# Patient Record
Sex: FEMALE | Race: WHITE | URBAN - METROPOLITAN AREA
[De-identification: names, ages, dates, MRNs, and addresses within clinical notes are randomized per-mention and may not be internally consistent; named-entity substitution may affect disease eponyms.]

---

## 2017-07-21 ENCOUNTER — TRANSFERRED RECORDS (OUTPATIENT)
Dept: HEALTH INFORMATION MANAGEMENT | Facility: CLINIC | Age: 77
End: 2017-07-21

## 2017-08-04 ENCOUNTER — TRANSFERRED RECORDS (OUTPATIENT)
Dept: HEALTH INFORMATION MANAGEMENT | Facility: CLINIC | Age: 77
End: 2017-08-04

## 2017-08-10 ENCOUNTER — PRE VISIT (OUTPATIENT)
Dept: ORTHOPEDICS | Facility: CLINIC | Age: 77
End: 2017-08-10

## 2017-08-10 DIAGNOSIS — Z87.39 H/O ROTATOR CUFF TEAR: Primary | ICD-10-CM

## 2017-08-10 NOTE — TELEPHONE ENCOUNTER
Radiology reports received from [facility].  Xray right shoulder 8/12/16  MRI right shoulder 8/16/16  MR achilles tendon right 9/7/16  FL injection right shoulder 10/13/16

## 2017-08-10 NOTE — TELEPHONE ENCOUNTER
1.  Date/reason for appt: 8/21/17 11AM - Rt Shoulder Cuff Tear  2.  Referring provider: self  3.  Call to patient (Yes / No - short description): Yes - LVM for pt. Need FANNY for [facility]  4.  Previous care at / records requested from: [facility] -- faxed request in attempt for records

## 2017-08-14 NOTE — TELEPHONE ENCOUNTER
Records received from [facility].   Included  Office notes: 4/11/17, 7/21/17, 8/4/17  Radiology reports: MR right shoulder 1/18/17- ProScan alejandra Fagan   MR right shoulder 8/16/16  Xray right shoulder 8/12/16  FL injection 10/13/16  Other: injection 4/11/17

## 2017-08-21 ENCOUNTER — OFFICE VISIT (OUTPATIENT)
Dept: ORTHOPEDICS | Facility: CLINIC | Age: 77
End: 2017-08-21

## 2017-08-21 VITALS — BODY MASS INDEX: 25.27 KG/M2 | WEIGHT: 142.6 LBS | HEIGHT: 63 IN

## 2017-08-21 DIAGNOSIS — M25.519 CHRONIC SHOULDER PAIN, UNSPECIFIED LATERALITY: Primary | ICD-10-CM

## 2017-08-21 DIAGNOSIS — G89.29 CHRONIC SHOULDER PAIN, UNSPECIFIED LATERALITY: Primary | ICD-10-CM

## 2017-08-21 PROBLEM — M75.111 INCOMPLETE TEAR OF RIGHT ROTATOR CUFF: Status: ACTIVE | Noted: 2017-04-14

## 2017-08-21 RX ORDER — CHLORAL HYDRATE 500 MG
CAPSULE ORAL
COMMUNITY
Start: 2010-04-12

## 2017-08-21 RX ORDER — ZOLPIDEM TARTRATE 5 MG/1
5 TABLET ORAL
COMMUNITY
Start: 2016-10-27

## 2017-08-21 RX ORDER — TRAZODONE HYDROCHLORIDE 50 MG/1
25-50 TABLET, FILM COATED ORAL
COMMUNITY
Start: 2016-10-27

## 2017-08-21 RX ORDER — IBUPROFEN 200 MG
TABLET ORAL
COMMUNITY

## 2017-08-21 RX ORDER — LORATADINE 10 MG/1
10 TABLET ORAL
COMMUNITY
Start: 2012-10-25

## 2017-08-21 RX ORDER — ESCITALOPRAM OXALATE 20 MG/1
20 TABLET ORAL
COMMUNITY
Start: 2017-07-26

## 2017-08-21 ASSESSMENT — ENCOUNTER SYMPTOMS
SWOLLEN GLANDS: 0
DISTURBANCES IN COORDINATION: 0
NUMBNESS: 0
PARALYSIS: 0
TREMORS: 0
LOSS OF CONSCIOUSNESS: 0
TINGLING: 0
DIZZINESS: 1
HEADACHES: 0
SPEECH CHANGE: 0
MEMORY LOSS: 1
WEAKNESS: 0
SEIZURES: 0
BRUISES/BLEEDS EASILY: 1

## 2017-08-21 NOTE — NURSING NOTE
"Reason For Visit:   Chief Complaint   Patient presents with     Consult     RIght shoulder rotator cuff tear. Saw Dr. Carter at [facility] in 9/2016.        PCP: No primary care provider on file.  Ref: self    ?  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  Date of injury: 2016  Type of injury: Carrying a heavy box downstairs. Had a fall later in the year.  Date of surgery: None  Smoker: No      Right hand dominant    SANE score  Affected shoulder: Right  Right shoulder SANE: 10  Left shoulder SANE: 75    Dynamometer  Forward Elevation:  R = 5 pounds,  L = 15 pounds  External Rotation:   R = 10 pounds,  L = 11 pounds    Ht 1.6 m (5' 3\")  Wt 64.7 kg (142 lb 9.6 oz)  BMI 25.26 kg/m2      Pain Assessment  Patient Currently in Pain: Yes  0-10 Pain Scale: 8  Primary Pain Location: Shoulder  Pain Orientation: Right  Pain Descriptors: Aching, Constant, Discomfort  Alleviating Factors: Rest  Aggravating Factors: Movement      Cielo Ceja LPN      "

## 2017-08-21 NOTE — PROGRESS NOTES
REASON FOR VISIT:  Right shoulder rotator cuff tear.      HISTORY OF PRESENT ILLNESS:  Autumn Matthews is a 77-year-old right dominant female with right-sided shoulder pain present for greater than 1 year.  She has previously been seen by Dr. Carter in 2016 at [facility].  She has since followed with Dr. Forrest.  Prior interventions for her right shoulder include intraarticular injection in 2016, subacromial injection in 04/2017 and 07/21/2017 with diminishing effect from subsequent injections, now no relief from her ongoing anterior AC and nighttime right shoulder pain.  She presents today to discuss surgical interventions.  She lives much of her year in [place], anticipates if it were not for intervention, would return there in October but is willing to forego this if surgery is necessary.      PAST MEDICAL HISTORY:  Hemochromatosis, anxiety, osteopenia, hyperlipidemia, idiopathic scoliosis, hip arthritis, bilateral knee replacements, insomnia, lumbar facet arthropathy.      PAST SURGICAL HISTORY:  Appendectomy, dilation and curettage, blepharoplasty, brow lift, intracapsular cataract extraction, breast biopsy, total knee arthroplasty of the right 2014 and the left in 2014.      FAMILY HISTORY:  No history of bleeding or clotting problems.      SOCIAL HISTORY:  Does not drink alcohol, does not smoke, lives 4-5 months of the year in [place].      MEDICATIONS:  Vitamin D, Citalopram, loratadine, magnesium, multivitamin, fish oil, Trazadone.      PHYSICAL EXAMINATION:  Alert and oriented, in no apparent distress, comfortable on room air, a well-appearing, well-nourished female appearing stated age.      Examination of the right upper extremity reveals she has no significant atrophy of her deltoid, no prior surgical scars.  Forward flexion to 80 degrees, abduction to 60 degrees.  Pain with passive shoulder flexion and abduction beyond 90 degrees.  Positive Phyllis.  Positive Neer.  Positive tenderness over the AC joint.  4-/5,  external rotation, 4/5 forward flexion, 4/5 abduction, 4/5 internal rotation, elbow at the side.  Sensation intact medial, ulnar, radial, axillary distributions.      IMAGING:  MRI right shoulder 01/18/2017 demonstrates a full thickness retracted to the edge of the humeral head, supraspinatus and anterior infraspinatus.  Complete tear of the subscapularis.  Retracted long head of the biceps.      IMPRESSION:  77-year-old right hand dominant female with right supraspinatus cuff tear, partial infraspinatus cuff tear and subscapularis with degenerative spontaneous long head of the biceps tear.  AC joint symptomatic arthrosis.      PLAN:  We discussed these findings and noted given her last injection at 07/21/2017 she would not be a candidate for any surgical intervention for a 3 month period.  I will see her back in September with an MRI repeated on the right side to review the muscular atrophy and then further consider if she is a candidate for arthroscopic rotator cuff repair versus reverse total shoulder arthroplasty.      Seen and examined by Dr. Carter who is in agreement with the plan as above.       Answers for HPI/ROS submitted by the patient on 8/21/2017   General Symptoms: No  Skin Symptoms: No  HENT Symptoms: No  EYE SYMPTOMS: No  HEART SYMPTOMS: No  LUNG SYMPTOMS: No  INTESTINAL SYMPTOMS: No  URINARY SYMPTOMS: No  GYNECOLOGIC SYMPTOMS: No  BREAST SYMPTOMS: No  SKELETAL SYMPTOMS: No  BLOOD SYMPTOMS: Yes  NERVOUS SYSTEM SYMPTOMS: Yes  MENTAL HEALTH SYMPTOMS: No  Anemia: No  Swollen glands: No  Easy bleeding or bruising: Yes  Edema or swelling: No  Trouble with coordination: No  Dizziness or trouble with balance: Yes  Fainting or black-out spells: No  Memory loss: Yes  Headache: No  Seizures: No  Speech problems: No  Tingling: No  Tremor: No  Weakness: No  Difficulty walking: No  Paralysis: No  Numbness: No  I saw the patient with the resident.  I agree with the resident note and plan of care.      Mike  Sathya Carter MD

## 2017-08-21 NOTE — MR AVS SNAPSHOT
After Visit Summary   8/21/2017    Autumn Matthews    MRN: [id number]           Patient Information     Date Of Birth          1940        Visit Information        Provider Department      8/21/2017 11:00 AM Mike Carter MD [facility]        Today's Diagnoses     Shoulder pain    -  1       Follow-ups after your visit        Your next 10 appointments already scheduled     Sep 18, 2017 12:40 PM CDT   (Arrive by 12:25 PM)   RETURN SHOULDER with Mike Carter MD   [facility] ([facility] [place])    [address] [place] [address] [id number] [phone]              Future tests that were ordered for you today     Open Future Orders        Priority Expected Expires Ordered    MRI Upper extremity joint w/o contrast RT* Routine  8/21/2018 8/21/2017            Who to contact     Please call your clinic at [phone] to:    Ask questions about your health    Make or cancel appointments    Discuss your medicines    Learn about your test results    Speak to your doctor   If you have compliments or concerns about an experience at your clinic, or if you wish to file a complaint, please contact [facility] Patient Relations at [phone] or email us at [email or web address]         Additional Information About Your Visit        MyChart Information     [employer] is an electronic gateway that provides easy, online access to your medical records. With [employer], you can request a clinic appointment, read your test results, renew a prescription or communicate with your care team.     To sign up for [employer] visit the website at [email or web address]   You will be asked to enter the access code listed below, as well as some personal information. Please follow the directions to create your username and password.     Your access code is: [email or web address]  Expires: 11/6/2017  6:31 AM    "  Your access code will  in 90 days. If you need help or a new code, please contact your [facility] or call [phone] for assistance.        Care EveryWhere ID     This is your Care EveryWhere ID. This could be used by other organizations to access your [facility] medical records  [id number]        Your Vitals Were     Height BMI (Body Mass Index)                1.6 m (5' 3\") 25.26 kg/m2           Blood Pressure from Last 3 Encounters:   No data found for BP    Weight from Last 3 Encounters:   17 64.7 kg (142 lb 9.6 oz)               Primary Care Provider    None Specified       No primary provider on file.        Equal Access to Services     [facility] Center[facility]LE[facility] : Kojo Abbasi, kun vora, faina watkins, azar jensen . So [facility] [phone].    ATENCIÓN: Si habla español, tiene a pinzon disposición servicios gratuitos de asistencia lingüística. Llame al [phone].    We comply with applicable federal civil rights laws and [place] laws. We do not discriminate on the basis of race, color, national origin, age, disability sex, sexual orientation or gender identity.            Thank you!     Thank you for choosing [facility]  for your care. Our goal is always to provide you with excellent care. Hearing back from our patients is one way we can continue to improve our services. Please take a few minutes to complete the written survey that you may receive in the mail after your visit with us. Thank you!             Your Updated Medication List - Protect others around you: Learn how to safely use, store and throw away your medicines at www.disposemymeds.org.          This list is accurate as of: 17 12:26 PM.  Always use your most recent med list.                   Brand Name Dispense Instructions for use Diagnosis    ADVIL 200 MG tablet   Generic drug:  ibuprofen           cholecalciferol 1000 UNIT " tablet    vitamin D     daily (every 24 hours).        escitalopram 20 MG tablet    LEXAPRO     Take 20 mg by mouth        ESTRADIOL 0.025/ESTRIOL 0.1 MG/GM CREAM      Estradiol vaginal cream 20mcg/gm.  Apply 0.5gm into vagina two times per week.        fish oil-omega-3 fatty acids 1000 MG capsule      daily (every 24 hours).        loratadine 10 MG tablet    CLARITIN     Take 10 mg by mouth        MAGNESIUM PO           MULTIPLE VITAMINS-MINERALS PO           traZODone 50 MG tablet    DESYREL     Take 25-50 mg by mouth        zolpidem 5 MG tablet    AMBIEN     Take 5 mg by mouth

## 2017-08-21 NOTE — LETTER
8/21/2017       RE: Autumn Matthews  [address]  [place] [address]     Dear Colleague,    Thank you for referring your patient, Autumn Matthews, to the [facility] at [facility]. Please see a copy of my visit note below.    REASON FOR VISIT:  Right shoulder rotator cuff tear.      HISTORY OF PRESENT ILLNESS:  Autumn Matthews is a 77-year-old right dominant female with right-sided shoulder pain present for greater than 1 year.  She has previously been seen by Dr. Carter in 2016 at [facility].  She has since followed with Dr. Forrest.  Prior interventions for her right shoulder include intraarticular injection in 2016, subacromial injection in 04/2017 and 07/21/2017 with diminishing effect from subsequent injections, now no relief from her ongoing anterior AC and nighttime right shoulder pain.  She presents today to discuss surgical interventions.  She lives much of her year in [place], anticipates if it were not for intervention, would return there in October but is willing to forego this if surgery is necessary.      PAST MEDICAL HISTORY:  Hemochromatosis, anxiety, osteopenia, hyperlipidemia, idiopathic scoliosis, hip arthritis, bilateral knee replacements, insomnia, lumbar facet arthropathy.      PAST SURGICAL HISTORY:  Appendectomy, dilation and curettage, blepharoplasty, brow lift, intracapsular cataract extraction, breast biopsy, total knee arthroplasty of the right 2014 and the left in 2014.      FAMILY HISTORY:  No history of bleeding or clotting problems.      SOCIAL HISTORY:  Does not drink alcohol, does not smoke, lives 4-5 months of the year in [place].      MEDICATIONS:  Vitamin D, Citalopram, loratadine, magnesium, multivitamin, fish oil, Trazadone.      PHYSICAL EXAMINATION:  Alert and oriented, in no apparent distress, comfortable on room air, a well-appearing, well-nourished female appearing stated age.      Examination of the right upper extremity  reveals she has no significant atrophy of her deltoid, no prior surgical scars.  Forward flexion to 80 degrees, abduction to 60 degrees.  Pain with passive shoulder flexion and abduction beyond 90 degrees.  Positive Phyllis.  Positive Neer.  Positive tenderness over the AC joint.  4-/5, external rotation, 4/5 forward flexion, 4/5 abduction, 4/5 internal rotation, elbow at the side.  Sensation intact medial, ulnar, radial, axillary distributions.      IMAGING:  MRI right shoulder 01/18/2017 demonstrates a full thickness retracted to the edge of the humeral head, supraspinatus and anterior infraspinatus.  Complete tear of the subscapularis.  Retracted long head of the biceps.      IMPRESSION:  77-year-old right hand dominant female with right supraspinatus cuff tear, partial infraspinatus cuff tear and subscapularis with degenerative spontaneous long head of the biceps tear.  AC joint symptomatic arthrosis.      PLAN:  We discussed these findings and noted given her last injection at 07/21/2017 she would not be a candidate for any surgical intervention for a 3 month period.  I will see her back in September with an MRI repeated on the right side to review the muscular atrophy and then further consider if she is a candidate for arthroscopic rotator cuff repair versus reverse total shoulder arthroplasty.      Seen and examined by Dr. Carter who is in agreement with the plan as above.     I saw the patient with the resident.  I agree with the resident note and plan of care.      Mike Carter MD

## 2017-08-22 ENCOUNTER — TELEPHONE (OUTPATIENT)
Dept: ORTHOPEDICS | Facility: CLINIC | Age: 77
End: 2017-08-22

## 2017-08-22 NOTE — TELEPHONE ENCOUNTER
Patient informed Dr Carter does not prescribe pain medications before surgery unless it is an acute fracture.  Recommendation is to discuss pain management with her primary MD.  She verbalized understanding and is agreeable with this.

## 2017-08-22 NOTE — TELEPHONE ENCOUNTER
Patient having pain in right shoulder with any kind of movement. Caller said she brought it up to Dr. Carter that the advil isn't helping, but then got off topic before it was resolved. Vicodin and Tramadol cause nausea, vomiting and dizziness. Daughter recommended Lyrica. Patient rates pain at 8/10. Please call her asap.

## 2017-09-18 ENCOUNTER — OFFICE VISIT (OUTPATIENT)
Dept: ORTHOPEDICS | Facility: CLINIC | Age: 77
End: 2017-09-18

## 2017-09-18 VITALS — BODY MASS INDEX: 24.98 KG/M2 | HEIGHT: 63 IN | WEIGHT: 141 LBS

## 2017-09-18 DIAGNOSIS — M75.101 ROTATOR CUFF TEAR ARTHROPATHY, RIGHT: Primary | ICD-10-CM

## 2017-09-18 DIAGNOSIS — M12.811 ROTATOR CUFF TEAR ARTHROPATHY, RIGHT: Primary | ICD-10-CM

## 2017-09-18 NOTE — NURSING NOTE
"Reason For Visit:   Chief Complaint   Patient presents with     Shoulder Pain     F/U MRI done at [facility], Right rotator cuff tear     PCP: No primary care provider on file.  Ref: self     ?  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  Date of injury: 2016  Type of injury: Carrying a heavy box downstairs. Had a fall later in the year.  Date of surgery: None  Smoker: No        Right hand dominant    SANE score  Affected shoulder:  Right  Right shoulder SANE: 20  Left shoulder SANE: 70    Ht 1.6 m (5' 3\")  Wt 64 kg (141 lb)  BMI 24.98 kg/m2      Pain Assessment  Patient Currently in Pain: Yes  0-10 Pain Scale: 6  Primary Pain Location: Shoulder  Pain Orientation: Right  Pain Descriptors: Aching, Discomfort, Constant  Alleviating Factors: Rest  Aggravating Factors: Movement, Stretching, Exercise    Cielo Ceja LPN      "

## 2017-09-18 NOTE — LETTER
9/18/2017       RE: Autumn Mattehws  [address]  [place] [address]     Dear Colleague,    Thank you for referring your patient, Autumn Matthews, to the [facility]IC at [facility]. Please see a copy of my visit note below.    REASON FOR VISIT:  Re-visit for MRI followup.      HISTORY OF PRESENT ILLNESS:  Autumn Matthews is a 77-year-old right hand dominant female with right-sided shoulder dysfunction secondary to a massive rotator cuff tear.  She was last seen 4 weeks ago and had previously undergone a corticosteroid injection which did offer her modest relief.  She was interested in further surgical options and we noted we would need a repeat MRI to assess her rotator cuff musculature prior to any intervention.  She returns today to review this.      Her function has not changed in the interim, she has limited forward flexion almost no external rotation and limited internal rotation strength with equivocal subscapularis testing.      PHYSICAL EXAMINATION:  Alert, oriented and in no apparent distress, comfortable on room air.  Ambulates in the room independently, does not use assistive device.  Otherwise, a well-appearing of stated age.  Examination of the right shoulder has forward flexion to approximately 50 degrees, active external rotation to neutral, internal rotation to the belly, but a negative belly press, negative bear hug not able to lift off, has pain with shoulder range of motion with any further active assisted motion.  Sensation is intact throughout with intact deltoid firing axillary sensation.      IMAGING:  MRI of the right shoulder from 9/16/2017 is reviewed, shows a massive rotator cuff tear involving supra, infra and most of the entire upper portion of the subscap.  There is migration of the humeral head into the acromion with degenerative change beginning significant acromioclavicular arthrosis.  The sagittal T1 is concerning and grade  3 atrophy of the subscapularis, 50% atrophy of the supraspinatus and 25% atrophy of the infra.      IMPRESSION:  77-year-old female with right massive rotator cuff repair with significant subscapularis atrophy.      PLAN:  We discussed her options going forward and that she could elect to undergo rotator cuff repair versus right reverse total shoulder arthroplasty.  We explored the potential outcomes for each of these and the risk that would be associated and she will elect as she moves forward with the plan accordingly.  We did lean towards a more predictable outcome from her reverse total shoulder arthroplasty, but this would require permanent 8 pound lifting restriction and limitation of tennis and golf for the foreseeable future.      She was seen and examined by Dr. Carter and will contact our office with further plans going forward.  Otherwise, follow up p.r.n.     I saw the patient with the resident.  I agree with the resident note and plan of care.      Mike Carter MD

## 2017-09-18 NOTE — NURSING NOTE
Teaching Flowsheet   Relevant Diagnosis: Rotator cuff atrophy  Teaching Topic: Pre-op for rotator cuff repair versus reverse TSA - patient will talk with her daughter concerning options and call with decision     Person(s) involved in teaching:   Patient     Motivation Level:  Asks Questions: Yes  Eager to Learn: Yes  Cooperative: Yes  Receptive (willing/able to accept information): Yes  Any cultural factors/Temple beliefs that may influence understanding or compliance? No     Patient demonstrates understanding of the following:  Reason for the appointment, diagnosis and treatment plan: Yes  Knowledge of proper use of medications and conditions for which they are ordered (with special attention to potential side effects or drug interactions): Yes  Which situations necessitate calling provider and whom to contact: Yes    Lives alone.  States her daughter who lives in [place] will stay with her for a week after surgery.  Possible TCU after hospital discharge (reverse TSA)     Proper use and care of sling x 6-8 weeks (medical equip, care aids, etc.): Yes  Nutritional needs and diet plan: Yes  Pain management techniques: Yes  Wound Care: Yes  How and/when to access community resources: Yes     Instructional Materials Used/Given: Pre-op packet, surgical soap, written information on reverse total shoulder     Time spent with patient: 12.

## 2017-09-18 NOTE — MR AVS SNAPSHOT
"              After Visit Summary   2017    Autumn Matthews    MRN: [id number]           Patient Information     Date Of Birth          1940        Visit Information        Provider Department      2017 12:40 PM Mike Carter MD [facility]        Today's Diagnoses     H/O rotator cuff tear    -  1       Follow-ups after your visit        Who to contact     Please call your clinic at [phone] to:    Ask questions about your health    Make or cancel appointments    Discuss your medicines    Learn about your test results    Speak to your doctor   If you have compliments or concerns about an experience at your clinic, or if you wish to file a complaint, please contact [facility] Patient Relations at [phone] or email us at [email or web address]         Additional Information About Your Visit        MyChart Information     Vine is an electronic gateway that provides easy, online access to your medical records. With Vine, you can request a clinic appointment, read your test results, renew a prescription or communicate with your care team.     To sign up for [email or web address] visit the website at [email or web address]   You will be asked to enter the access code listed below, as well as some personal information. Please follow the directions to create your username and password.     Your access code is: [email or web address]  Expires: 2017  6:31 AM     Your access code will  in 90 days. If you need help or a new code, please contact your [facility] or call [phone] for assistance.        Care EveryWhere ID     This is your Care EveryWhere ID. This could be used by other organizations to access your [place] medical records  [id number]        Your Vitals Were     Height BMI (Body Mass Index)                1.6 m (5' 3\") 24.98 kg/m2           Blood Pressure from Last 3 Encounters:   No data found " for BP    Weight from Last 3 Encounters:   09/18/17 64 kg (141 lb)   08/21/17 64.7 kg (142 lb 9.6 oz)              Today, you had the following     No orders found for display       Primary Care Provider    None Specified       No primary provider on file.        Equal Access to Services     MICHELLE KESSLER : Hadii aad ku hadyeisonarnav Shelbiesujit, wachioda luqadaha, qasánchezta kaalmada tialorenanichole, azar diazloidasaritha lucero. So [facility] [phone].    ATENCIÓN: Si habla español, tiene a pinzon disposición servicios gratuitos de asistencia lingüística. Llame al [phone].    We comply with applicable federal civil rights laws and [place] laws. We do not discriminate on the basis of race, color, national origin, age, disability sex, sexual orientation or gender identity.            Thank you!     Thank you for choosing [facility]  for your care. Our goal is always to provide you with excellent care. Hearing back from our patients is one way we can continue to improve our services. Please take a few minutes to complete the written survey that you may receive in the mail after your visit with us. Thank you!             Your Updated Medication List - Protect others around you: Learn how to safely use, store and throw away your medicines at www.disposemymeds.org.          This list is accurate as of: 9/18/17 11:59 PM.  Always use your most recent med list.                   Brand Name Dispense Instructions for use Diagnosis    ADVIL 200 MG tablet   Generic drug:  ibuprofen           cholecalciferol 1000 UNIT tablet    vitamin D     daily (every 24 hours).        escitalopram 20 MG tablet    LEXAPRO     Take 20 mg by mouth        ESTRADIOL 0.025/ESTRIOL 0.1 MG/GM CREAM      Estradiol vaginal cream 20mcg/gm.  Apply 0.5gm into vagina two times per week.        fish oil-omega-3 fatty acids 1000 MG capsule      daily (every 24 hours).        loratadine 10 MG tablet    CLARITIN     Take 10 mg by mouth         MAGNESIUM PO           MULTIPLE VITAMINS-MINERALS PO           traZODone 50 MG tablet    DESYREL     Take 25-50 mg by mouth        zolpidem 5 MG tablet    AMBIEN     Take 5 mg by mouth

## 2017-09-18 NOTE — PROGRESS NOTES
REASON FOR VISIT:  Re-visit for MRI followup.      HISTORY OF PRESENT ILLNESS:  Autumn Matthews is a 77-year-old right hand dominant female with right-sided shoulder dysfunction secondary to a massive rotator cuff tear.  She was last seen 4 weeks ago and had previously undergone a corticosteroid injection which did offer her modest relief.  She was interested in further surgical options and we noted we would need a repeat MRI to assess her rotator cuff musculature prior to any intervention.  She returns today to review this.      Her function has not changed in the interim, she has limited forward flexion almost no external rotation and limited internal rotation strength with equivocal subscapularis testing.      PHYSICAL EXAMINATION:  Alert, oriented and in no apparent distress, comfortable on room air.  Ambulates in the room independently, does not use assistive device.  Otherwise, a well-appearing of stated age.  Examination of the right shoulder has forward flexion to approximately 50 degrees, active external rotation to neutral, internal rotation to the belly, but a negative belly press, negative bear hug not able to lift off, has pain with shoulder range of motion with any further active assisted motion.  Sensation is intact throughout with intact deltoid firing axillary sensation.      IMAGING:  MRI of the right shoulder from 9/16/2017 is reviewed, shows a massive rotator cuff tear involving supra, infra and most of the entire upper portion of the subscap.  There is migration of the humeral head into the acromion with degenerative change beginning significant acromioclavicular arthrosis.  The sagittal T1 is concerning and grade 3 atrophy of the subscapularis, 50% atrophy of the supraspinatus and 25% atrophy of the infra.      IMPRESSION:  77-year-old female with right massive rotator cuff repair with significant subscapularis atrophy.      PLAN:  We discussed her options going forward and that she could elect  to undergo rotator cuff repair versus right reverse total shoulder arthroplasty.  We explored the potential outcomes for each of these and the risk that would be associated and she will elect as she moves forward with the plan accordingly.  We did lean towards a more predictable outcome from her reverse total shoulder arthroplasty, but this would require permanent 8 pound lifting restriction and limitation of tennis and golf for the foreseeable future.      She was seen and examined by Dr. Carter and will contact our office with further plans going forward.  Otherwise, follow up p.r.n.     I saw the patient with the resident.  I agree with the resident note and plan of care.      Mike Carter MD

## 2017-10-11 ENCOUNTER — APPOINTMENT (OUTPATIENT)
Dept: GENERAL RADIOLOGY | Facility: CLINIC | Age: 77
DRG: 483 | End: 2017-10-11
Attending: ORTHOPAEDIC SURGERY
Payer: MEDICARE

## 2017-10-11 ENCOUNTER — SURGERY (OUTPATIENT)
Age: 77
End: 2017-10-11

## 2017-10-11 ENCOUNTER — HOSPITAL ENCOUNTER (INPATIENT)
Facility: CLINIC | Age: 77
LOS: 3 days | Discharge: HOME HEALTH | DRG: 483 | End: 2017-10-14
Attending: ORTHOPAEDIC SURGERY | Admitting: ORTHOPAEDIC SURGERY
Payer: MEDICARE

## 2017-10-11 ENCOUNTER — ANESTHESIA EVENT (OUTPATIENT)
Dept: SURGERY | Facility: CLINIC | Age: 77
DRG: 483 | End: 2017-10-11
Payer: MEDICARE

## 2017-10-11 ENCOUNTER — ANESTHESIA (OUTPATIENT)
Dept: SURGERY | Facility: CLINIC | Age: 77
DRG: 483 | End: 2017-10-11
Payer: MEDICARE

## 2017-10-11 DIAGNOSIS — Z96.619 HISTORY OF TOTAL SHOULDER REPLACEMENT, UNSPECIFIED LATERALITY: Primary | ICD-10-CM

## 2017-10-11 LAB
ABO + RH BLD: NORMAL
ABO + RH BLD: NORMAL
ANION GAP SERPL CALCULATED.3IONS-SCNC: 6 MMOL/L (ref 3–14)
BLD GP AB SCN SERPL QL: NORMAL
BLOOD BANK CMNT PATIENT-IMP: NORMAL
BUN SERPL-MCNC: 18 MG/DL (ref 7–30)
CALCIUM SERPL-MCNC: 8.3 MG/DL (ref 8.5–10.1)
CHLORIDE SERPL-SCNC: 105 MMOL/L (ref 94–109)
CO2 SERPL-SCNC: 28 MMOL/L (ref 20–32)
CREAT SERPL-MCNC: 1 MG/DL (ref 0.52–1.04)
GFR SERPL CREATININE-BSD FRML MDRD: 54 ML/MIN/1.7M2
GLUCOSE SERPL-MCNC: 196 MG/DL (ref 70–99)
GLUCOSE SERPL-MCNC: 89 MG/DL (ref 70–99)
POTASSIUM SERPL-SCNC: 3.9 MMOL/L (ref 3.4–5.3)
SODIUM SERPL-SCNC: 139 MMOL/L (ref 133–144)
SPECIMEN EXP DATE BLD: NORMAL

## 2017-10-11 PROCEDURE — 25000128 H RX IP 250 OP 636: Performed by: ANESTHESIOLOGY

## 2017-10-11 PROCEDURE — 25000125 ZZHC RX 250: Performed by: ANESTHESIOLOGY

## 2017-10-11 PROCEDURE — 36415 COLL VENOUS BLD VENIPUNCTURE: CPT | Performed by: ORTHOPAEDIC SURGERY

## 2017-10-11 PROCEDURE — 99231 SBSQ HOSP IP/OBS SF/LOW 25: CPT | Performed by: INTERNAL MEDICINE

## 2017-10-11 PROCEDURE — A9270 NON-COVERED ITEM OR SERVICE: HCPCS | Mod: GY | Performed by: INTERNAL MEDICINE

## 2017-10-11 PROCEDURE — 82947 ASSAY GLUCOSE BLOOD QUANT: CPT | Performed by: ANESTHESIOLOGY

## 2017-10-11 PROCEDURE — 36000062 ZZH SURGERY LEVEL 4 1ST 30 MIN - UMMC: Performed by: ORTHOPAEDIC SURGERY

## 2017-10-11 PROCEDURE — 40000986 XR SHOULDER RT PORT G/E 2 VW: Mod: RT

## 2017-10-11 PROCEDURE — C9290 INJ, BUPIVACAINE LIPOSOME: HCPCS | Performed by: ANESTHESIOLOGY

## 2017-10-11 PROCEDURE — 25000132 ZZH RX MED GY IP 250 OP 250 PS 637: Mod: GY | Performed by: ORTHOPAEDIC SURGERY

## 2017-10-11 PROCEDURE — 25000128 H RX IP 250 OP 636: Performed by: STUDENT IN AN ORGANIZED HEALTH CARE EDUCATION/TRAINING PROGRAM

## 2017-10-11 PROCEDURE — 27110028 ZZH OR GENERAL SUPPLY NON-STERILE: Performed by: ORTHOPAEDIC SURGERY

## 2017-10-11 PROCEDURE — 86850 RBC ANTIBODY SCREEN: CPT | Performed by: ORTHOPAEDIC SURGERY

## 2017-10-11 PROCEDURE — A9270 NON-COVERED ITEM OR SERVICE: HCPCS | Mod: GY | Performed by: ORTHOPAEDIC SURGERY

## 2017-10-11 PROCEDURE — 0RRJ00Z REPLACEMENT OF RIGHT SHOULDER JOINT WITH REVERSE BALL AND SOCKET SYNTHETIC SUBSTITUTE, OPEN APPROACH: ICD-10-PCS | Performed by: ORTHOPAEDIC SURGERY

## 2017-10-11 PROCEDURE — 71000014 ZZH RECOVERY PHASE 1 LEVEL 2 FIRST HR: Performed by: ORTHOPAEDIC SURGERY

## 2017-10-11 PROCEDURE — 80048 BASIC METABOLIC PNL TOTAL CA: CPT | Performed by: INTERNAL MEDICINE

## 2017-10-11 PROCEDURE — 25000128 H RX IP 250 OP 636: Performed by: ORTHOPAEDIC SURGERY

## 2017-10-11 PROCEDURE — 25000125 ZZHC RX 250: Performed by: NURSE ANESTHETIST, CERTIFIED REGISTERED

## 2017-10-11 PROCEDURE — 37000009 ZZH ANESTHESIA TECHNICAL FEE, EACH ADDTL 15 MIN: Performed by: ORTHOPAEDIC SURGERY

## 2017-10-11 PROCEDURE — C1713 ANCHOR/SCREW BN/BN,TIS/BN: HCPCS | Performed by: ORTHOPAEDIC SURGERY

## 2017-10-11 PROCEDURE — 86901 BLOOD TYPING SEROLOGIC RH(D): CPT | Performed by: ORTHOPAEDIC SURGERY

## 2017-10-11 PROCEDURE — 25000132 ZZH RX MED GY IP 250 OP 250 PS 637: Mod: GY | Performed by: INTERNAL MEDICINE

## 2017-10-11 PROCEDURE — 25800025 ZZH RX 258: Performed by: ORTHOPAEDIC SURGERY

## 2017-10-11 PROCEDURE — 25000125 ZZHC RX 250: Performed by: ORTHOPAEDIC SURGERY

## 2017-10-11 PROCEDURE — 25000128 H RX IP 250 OP 636: Performed by: NURSE ANESTHETIST, CERTIFIED REGISTERED

## 2017-10-11 PROCEDURE — 12000001 ZZH R&B MED SURG/OB UMMC

## 2017-10-11 PROCEDURE — 37000008 ZZH ANESTHESIA TECHNICAL FEE, 1ST 30 MIN: Performed by: ORTHOPAEDIC SURGERY

## 2017-10-11 PROCEDURE — 86900 BLOOD TYPING SEROLOGIC ABO: CPT | Performed by: ORTHOPAEDIC SURGERY

## 2017-10-11 PROCEDURE — 40000171 ZZH STATISTIC PRE-PROCEDURE ASSESSMENT III: Performed by: ORTHOPAEDIC SURGERY

## 2017-10-11 PROCEDURE — 36415 COLL VENOUS BLD VENIPUNCTURE: CPT | Performed by: INTERNAL MEDICINE

## 2017-10-11 PROCEDURE — 36000064 ZZH SURGERY LEVEL 4 EA 15 ADDTL MIN - UMMC: Performed by: ORTHOPAEDIC SURGERY

## 2017-10-11 PROCEDURE — C1776 JOINT DEVICE (IMPLANTABLE): HCPCS | Performed by: ORTHOPAEDIC SURGERY

## 2017-10-11 PROCEDURE — 27210794 ZZH OR GENERAL SUPPLY STERILE: Performed by: ORTHOPAEDIC SURGERY

## 2017-10-11 DEVICE — IMP SCR LOCKING BIOM REV SHLDR 3.5 HEX 4.75X20MM 180551: Type: IMPLANTABLE DEVICE | Site: SHOULDER | Status: FUNCTIONAL

## 2017-10-11 DEVICE — IMP GLENOSPHERE BIOM REV SHLDR 41MM STD 115320: Type: IMPLANTABLE DEVICE | Site: SHOULDER | Status: FUNCTIONAL

## 2017-10-11 DEVICE — IMP HUMERAL TRAY BIOM REV SHLDR 44MM STD 115370: Type: IMPLANTABLE DEVICE | Site: SHOULDER | Status: FUNCTIONAL

## 2017-10-11 DEVICE — IMP BASEPLATE MINI GLENOSPHERE BIOM REV SHLDR 25MM 010000589: Type: IMPLANTABLE DEVICE | Site: SHOULDER | Status: FUNCTIONAL

## 2017-10-11 DEVICE — IMP SCR LOCKING BIOM REV SHLDR 3.5 HEX 4.75X15MM 180550: Type: IMPLANTABLE DEVICE | Site: SHOULDER | Status: FUNCTIONAL

## 2017-10-11 DEVICE — IMP HUMERAL BEARING BIOM REV SHLDR 44-41 STD XL-115366: Type: IMPLANTABLE DEVICE | Site: SHOULDER | Status: FUNCTIONAL

## 2017-10-11 DEVICE — IMPLANTABLE DEVICE: Type: IMPLANTABLE DEVICE | Site: SHOULDER | Status: FUNCTIONAL

## 2017-10-11 DEVICE — IMP SCR LOCKING BIOM REV SHLDR 3.5 HEX 4.75X30MM 180553: Type: IMPLANTABLE DEVICE | Site: SHOULDER | Status: FUNCTIONAL

## 2017-10-11 RX ORDER — PROCHLORPERAZINE MALEATE 5 MG
5 TABLET ORAL EVERY 6 HOURS PRN
Status: DISCONTINUED | OUTPATIENT
Start: 2017-10-11 | End: 2017-10-14 | Stop reason: HOSPADM

## 2017-10-11 RX ORDER — FENTANYL CITRATE 50 UG/ML
25-50 INJECTION, SOLUTION INTRAMUSCULAR; INTRAVENOUS
Status: DISCONTINUED | OUTPATIENT
Start: 2017-10-11 | End: 2017-10-11 | Stop reason: HOSPADM

## 2017-10-11 RX ORDER — ZOLPIDEM TARTRATE 5 MG/1
5 TABLET ORAL
Status: DISCONTINUED | OUTPATIENT
Start: 2017-10-11 | End: 2017-10-12

## 2017-10-11 RX ORDER — HYDROXYZINE HYDROCHLORIDE 10 MG/1
10 TABLET, FILM COATED ORAL EVERY 6 HOURS PRN
Status: DISCONTINUED | OUTPATIENT
Start: 2017-10-11 | End: 2017-10-11

## 2017-10-11 RX ORDER — ONDANSETRON 4 MG/1
4 TABLET, ORALLY DISINTEGRATING ORAL EVERY 30 MIN PRN
Status: DISCONTINUED | OUTPATIENT
Start: 2017-10-11 | End: 2017-10-11 | Stop reason: HOSPADM

## 2017-10-11 RX ORDER — ONDANSETRON 2 MG/ML
INJECTION INTRAMUSCULAR; INTRAVENOUS PRN
Status: DISCONTINUED | OUTPATIENT
Start: 2017-10-11 | End: 2017-10-11

## 2017-10-11 RX ORDER — ESCITALOPRAM OXALATE 20 MG/1
20 TABLET ORAL DAILY
Status: DISCONTINUED | OUTPATIENT
Start: 2017-10-12 | End: 2017-10-14 | Stop reason: HOSPADM

## 2017-10-11 RX ORDER — SODIUM CHLORIDE 9 MG/ML
INJECTION, SOLUTION INTRAVENOUS CONTINUOUS
Status: DISCONTINUED | OUTPATIENT
Start: 2017-10-11 | End: 2017-10-12

## 2017-10-11 RX ORDER — BUPIVACAINE HYDROCHLORIDE AND EPINEPHRINE 2.5; 5 MG/ML; UG/ML
INJECTION, SOLUTION INFILTRATION; PERINEURAL PRN
Status: DISCONTINUED | OUTPATIENT
Start: 2017-10-11 | End: 2017-10-11

## 2017-10-11 RX ORDER — METOCLOPRAMIDE HYDROCHLORIDE 5 MG/ML
5 INJECTION INTRAMUSCULAR; INTRAVENOUS EVERY 6 HOURS PRN
Status: DISCONTINUED | OUTPATIENT
Start: 2017-10-11 | End: 2017-10-14 | Stop reason: HOSPADM

## 2017-10-11 RX ORDER — FENTANYL CITRATE 50 UG/ML
25 INJECTION, SOLUTION INTRAMUSCULAR; INTRAVENOUS ONCE
Status: COMPLETED | OUTPATIENT
Start: 2017-10-11 | End: 2017-10-11

## 2017-10-11 RX ORDER — DEXAMETHASONE SODIUM PHOSPHATE 4 MG/ML
INJECTION, SOLUTION INTRA-ARTICULAR; INTRALESIONAL; INTRAMUSCULAR; INTRAVENOUS; SOFT TISSUE PRN
Status: DISCONTINUED | OUTPATIENT
Start: 2017-10-11 | End: 2017-10-11

## 2017-10-11 RX ORDER — NALOXONE HYDROCHLORIDE 0.4 MG/ML
.1-.4 INJECTION, SOLUTION INTRAMUSCULAR; INTRAVENOUS; SUBCUTANEOUS
Status: DISCONTINUED | OUTPATIENT
Start: 2017-10-11 | End: 2017-10-14 | Stop reason: HOSPADM

## 2017-10-11 RX ORDER — CEFAZOLIN SODIUM 2 G/100ML
2 INJECTION, SOLUTION INTRAVENOUS EVERY 8 HOURS
Status: COMPLETED | OUTPATIENT
Start: 2017-10-11 | End: 2017-10-12

## 2017-10-11 RX ORDER — EPHEDRINE SULFATE 50 MG/ML
INJECTION, SOLUTION INTRAMUSCULAR; INTRAVENOUS; SUBCUTANEOUS PRN
Status: DISCONTINUED | OUTPATIENT
Start: 2017-10-11 | End: 2017-10-11

## 2017-10-11 RX ORDER — HYDROMORPHONE HYDROCHLORIDE 2 MG/1
2-4 TABLET ORAL EVERY 4 HOURS PRN
Status: DISCONTINUED | OUTPATIENT
Start: 2017-10-11 | End: 2017-10-11

## 2017-10-11 RX ORDER — SODIUM CHLORIDE, SODIUM LACTATE, POTASSIUM CHLORIDE, CALCIUM CHLORIDE 600; 310; 30; 20 MG/100ML; MG/100ML; MG/100ML; MG/100ML
INJECTION, SOLUTION INTRAVENOUS CONTINUOUS
Status: DISCONTINUED | OUTPATIENT
Start: 2017-10-11 | End: 2017-10-11 | Stop reason: HOSPADM

## 2017-10-11 RX ORDER — PROPOFOL 10 MG/ML
INJECTION, EMULSION INTRAVENOUS PRN
Status: DISCONTINUED | OUTPATIENT
Start: 2017-10-11 | End: 2017-10-11

## 2017-10-11 RX ORDER — LIDOCAINE 40 MG/G
CREAM TOPICAL
Status: DISCONTINUED | OUTPATIENT
Start: 2017-10-11 | End: 2017-10-14 | Stop reason: HOSPADM

## 2017-10-11 RX ORDER — AMOXICILLIN 250 MG
1-2 CAPSULE ORAL 2 TIMES DAILY
Status: DISCONTINUED | OUTPATIENT
Start: 2017-10-11 | End: 2017-10-14 | Stop reason: HOSPADM

## 2017-10-11 RX ORDER — ACETAMINOPHEN 325 MG/1
975 TABLET ORAL EVERY 8 HOURS
Status: DISPENSED | OUTPATIENT
Start: 2017-10-11 | End: 2017-10-14

## 2017-10-11 RX ORDER — LIDOCAINE 40 MG/G
CREAM TOPICAL
Status: DISCONTINUED | OUTPATIENT
Start: 2017-10-11 | End: 2017-10-11 | Stop reason: HOSPADM

## 2017-10-11 RX ORDER — ONDANSETRON 4 MG/1
4 TABLET, ORALLY DISINTEGRATING ORAL EVERY 6 HOURS PRN
Status: DISCONTINUED | OUTPATIENT
Start: 2017-10-11 | End: 2017-10-14 | Stop reason: HOSPADM

## 2017-10-11 RX ORDER — ONDANSETRON 2 MG/ML
4 INJECTION INTRAMUSCULAR; INTRAVENOUS EVERY 30 MIN PRN
Status: DISCONTINUED | OUTPATIENT
Start: 2017-10-11 | End: 2017-10-11 | Stop reason: HOSPADM

## 2017-10-11 RX ORDER — HYDROMORPHONE HYDROCHLORIDE 1 MG/ML
.3-.5 INJECTION, SOLUTION INTRAMUSCULAR; INTRAVENOUS; SUBCUTANEOUS
Status: DISCONTINUED | OUTPATIENT
Start: 2017-10-11 | End: 2017-10-11

## 2017-10-11 RX ORDER — GABAPENTIN 100 MG/1
300 CAPSULE ORAL 3 TIMES DAILY
Status: DISCONTINUED | OUTPATIENT
Start: 2017-10-11 | End: 2017-10-14 | Stop reason: HOSPADM

## 2017-10-11 RX ORDER — HYDROMORPHONE HCL/0.9% NACL/PF 0.2MG/0.2
.2-.4 SYRINGE (ML) INTRAVENOUS
Status: DISCONTINUED | OUTPATIENT
Start: 2017-10-11 | End: 2017-10-13

## 2017-10-11 RX ORDER — ACETAMINOPHEN 325 MG/1
650 TABLET ORAL EVERY 4 HOURS PRN
Status: DISCONTINUED | OUTPATIENT
Start: 2017-10-14 | End: 2017-10-14 | Stop reason: HOSPADM

## 2017-10-11 RX ORDER — PROPOFOL 10 MG/ML
INJECTION, EMULSION INTRAVENOUS CONTINUOUS PRN
Status: DISCONTINUED | OUTPATIENT
Start: 2017-10-11 | End: 2017-10-11

## 2017-10-11 RX ORDER — CEFAZOLIN SODIUM 2 G/100ML
2 INJECTION, SOLUTION INTRAVENOUS
Status: COMPLETED | OUTPATIENT
Start: 2017-10-11 | End: 2017-10-11

## 2017-10-11 RX ORDER — ONDANSETRON 2 MG/ML
4 INJECTION INTRAMUSCULAR; INTRAVENOUS EVERY 6 HOURS PRN
Status: DISCONTINUED | OUTPATIENT
Start: 2017-10-11 | End: 2017-10-14 | Stop reason: HOSPADM

## 2017-10-11 RX ORDER — CEFAZOLIN SODIUM 1 G/3ML
1 INJECTION, POWDER, FOR SOLUTION INTRAMUSCULAR; INTRAVENOUS SEE ADMIN INSTRUCTIONS
Status: DISCONTINUED | OUTPATIENT
Start: 2017-10-11 | End: 2017-10-11 | Stop reason: HOSPADM

## 2017-10-11 RX ORDER — NALOXONE HYDROCHLORIDE 0.4 MG/ML
.1-.4 INJECTION, SOLUTION INTRAMUSCULAR; INTRAVENOUS; SUBCUTANEOUS
Status: DISCONTINUED | OUTPATIENT
Start: 2017-10-11 | End: 2017-10-12

## 2017-10-11 RX ORDER — LIDOCAINE HYDROCHLORIDE 20 MG/ML
INJECTION, SOLUTION INFILTRATION; PERINEURAL PRN
Status: DISCONTINUED | OUTPATIENT
Start: 2017-10-11 | End: 2017-10-11

## 2017-10-11 RX ORDER — METOCLOPRAMIDE 5 MG/1
5 TABLET ORAL EVERY 6 HOURS PRN
Status: DISCONTINUED | OUTPATIENT
Start: 2017-10-11 | End: 2017-10-14 | Stop reason: HOSPADM

## 2017-10-11 RX ORDER — LORATADINE 10 MG/1
10 TABLET ORAL DAILY
Status: DISCONTINUED | OUTPATIENT
Start: 2017-10-12 | End: 2017-10-14 | Stop reason: HOSPADM

## 2017-10-11 RX ADMIN — POLYMYXIN B SULFATE 1000 ML: 500000 INJECTION, POWDER, LYOPHILIZED, FOR SOLUTION INTRAMUSCULAR; INTRATHECAL; INTRAVENOUS; OPHTHALMIC at 13:49

## 2017-10-11 RX ADMIN — PHENYLEPHRINE HYDROCHLORIDE 0.1 MCG/KG/MIN: 10 INJECTION, SOLUTION INTRAMUSCULAR; INTRAVENOUS; SUBCUTANEOUS at 12:46

## 2017-10-11 RX ADMIN — ACETAMINOPHEN 975 MG: 325 TABLET, FILM COATED ORAL at 16:14

## 2017-10-11 RX ADMIN — FENTANYL CITRATE 25 MCG: 50 INJECTION, SOLUTION INTRAMUSCULAR; INTRAVENOUS at 11:15

## 2017-10-11 RX ADMIN — HYDROMORPHONE HYDROCHLORIDE 2 MG: 2 TABLET ORAL at 16:16

## 2017-10-11 RX ADMIN — CEFAZOLIN SODIUM 2 G: 2 INJECTION, SOLUTION INTRAVENOUS at 22:35

## 2017-10-11 RX ADMIN — LIDOCAINE HYDROCHLORIDE 40 MG: 20 INJECTION, SOLUTION INFILTRATION; PERINEURAL at 12:20

## 2017-10-11 RX ADMIN — PROPOFOL 110 MG: 10 INJECTION, EMULSION INTRAVENOUS at 12:20

## 2017-10-11 RX ADMIN — Medication 10 MG: at 12:44

## 2017-10-11 RX ADMIN — Medication 2 MG: at 22:07

## 2017-10-11 RX ADMIN — BUPIVACAINE HYDROCHLORIDE AND EPINEPHRINE BITARTRATE 10 ML: 2.5; .005 INJECTION, SOLUTION INFILTRATION; PERINEURAL at 11:50

## 2017-10-11 RX ADMIN — GABAPENTIN 300 MG: 100 CAPSULE ORAL at 16:14

## 2017-10-11 RX ADMIN — SENNOSIDES AND DOCUSATE SODIUM 1 TABLET: 8.6; 5 TABLET ORAL at 20:53

## 2017-10-11 RX ADMIN — DEXAMETHASONE SODIUM PHOSPHATE 4 MG: 4 INJECTION, SOLUTION INTRAMUSCULAR; INTRAVENOUS at 12:35

## 2017-10-11 RX ADMIN — GABAPENTIN 300 MG: 100 CAPSULE ORAL at 20:53

## 2017-10-11 RX ADMIN — ONDANSETRON 4 MG: 4 TABLET, ORALLY DISINTEGRATING ORAL at 16:12

## 2017-10-11 RX ADMIN — BUPIVACAINE 10 ML: 13.3 INJECTION, SUSPENSION, LIPOSOMAL INFILTRATION at 11:50

## 2017-10-11 RX ADMIN — PROPOFOL 125 MCG/KG/MIN: 10 INJECTION, EMULSION INTRAVENOUS at 12:21

## 2017-10-11 RX ADMIN — PROPOFOL: 10 INJECTION, EMULSION INTRAVENOUS at 13:08

## 2017-10-11 RX ADMIN — ZOLPIDEM TARTRATE 5 MG: 5 TABLET, FILM COATED ORAL at 22:06

## 2017-10-11 RX ADMIN — CEFAZOLIN SODIUM 2 G: 2 INJECTION, SOLUTION INTRAVENOUS at 12:28

## 2017-10-11 RX ADMIN — PROPOFOL 50 MG: 10 INJECTION, EMULSION INTRAVENOUS at 13:47

## 2017-10-11 RX ADMIN — SODIUM CHLORIDE, POTASSIUM CHLORIDE, SODIUM LACTATE AND CALCIUM CHLORIDE: 600; 310; 30; 20 INJECTION, SOLUTION INTRAVENOUS at 12:03

## 2017-10-11 RX ADMIN — VITAMIN D, TAB 1000IU (100/BT) 1000 UNITS: 25 TAB at 16:16

## 2017-10-11 RX ADMIN — ONDANSETRON 4 MG: 2 INJECTION INTRAMUSCULAR; INTRAVENOUS at 13:46

## 2017-10-11 RX ADMIN — SODIUM CHLORIDE, POTASSIUM CHLORIDE, SODIUM LACTATE AND CALCIUM CHLORIDE: 600; 310; 30; 20 INJECTION, SOLUTION INTRAVENOUS at 14:05

## 2017-10-11 RX ADMIN — Medication 10 MG: at 12:41

## 2017-10-11 NOTE — ANESTHESIA PREPROCEDURE EVALUATION
Anesthesia Plan      History & Physical Review  History and physical reviewed and following examination; no interval change.    ASA Status:  1 .    NPO Status:  > 8 hours    Plan for General, ETT and Peripheral Nerve Block with Propofol and Intravenous induction. Maintenance will be Balanced.    PONV prophylaxis:  Ondansetron (or other 5HT-3) and Dexamethasone or Solumedrol       Postoperative Care  Postoperative pain management:  IV analgesics, Oral pain medications, Peripheral nerve block (Single Shot) and Multi-modal analgesia.      Consents  Anesthetic plan, risks, benefits and alternatives discussed with:  Patient..            ANESTHESIA PREOP EVALUATION    PROCEDURE: Procedure(s):  Right Reverse Total Shoulder Arthroplasty ([facility] Anesthesia) - Wound Class: I-Clean    HPI: Autumn Matthews is a 77 year old female who presents for above procedure.    PAST MEDICAL HISTORY:    Past Medical History:   Diagnosis Date     PONV (postoperative nausea and vomiting)        PAST SURGICAL HISTORY:    Past Surgical History:   Procedure Laterality Date     APPENDECTOMY       ARTHROPLASTY KNEE BILATERAL Bilateral      BLEPHAROPLASTY       CATARACT IOL, RT/LT         PAST ANESTHESIA HISTORY:     No personal or family h/o anesthesia problems    SOCIAL HISTORY:       Social History   Substance Use Topics     Smoking status: Never Smoker     Smokeless tobacco: Never Used     Alcohol use Yes      Comment: occasional        ALLERGIES:     Allergies   Allergen Reactions     Codeine GI Disturbance     PN: LW Reaction: GI Upset     Erythromycin GI Disturbance     PN: LW Reaction: GI Upset     Hydrocodone-Acetaminophen Nausea     PN: nausea     Latex      PN: Converted from LW Latex Sensitivity Flag     Naproxen      PN: LW Reaction: GI Upset     No Clinical Screening - See Comments      PN: LW Other1: -adhesive-rash red     Piroxicam      PN: LW Reaction: GI Upset       MEDICATIONS:     Prescriptions Prior to  Admission   Medication Sig Dispense Refill Last Dose     GABAPENTIN PO Take 300 mg by mouth 3 times daily    10/11/2017 at 0800     ESTRADIOL 0.025/ESTRIOL 0.1 MG/GM CREAM Estradiol vaginal cream 20mcg/gm.  Apply 0.5gm into vagina two times per week.   Past Week at Unknown time     escitalopram (LEXAPRO) 20 MG tablet Take 20 mg by mouth   10/11/2017 at 0800     loratadine (CLARITIN) 10 MG tablet Take 10 mg by mouth   10/11/2017 at 0800     fish oil-omega-3 fatty acids 1000 MG capsule daily (every 24 hours).   10/9/2017 at Unknown time     traZODone (DESYREL) 50 MG tablet Take 25-50 mg by mouth   10/10/2017 at 2100     zolpidem (AMBIEN) 5 MG tablet Take 5 mg by mouth   Past Month at Unknown time     cholecalciferol (VITAMIN D3) 1000 UNIT tablet daily (every 24 hours).   10/9/2017     MAGNESIUM PO    10/9/2017     MULTIPLE VITAMINS-MINERALS PO    10/9/2017     ibuprofen (ADVIL) 200 MG tablet    10/8/2017       No current outpatient prescriptions on file.       Current Facility-Administered Medications Ordered in Epic   Medication Dose Route Frequency Provider Last Rate Last Dose     ceFAZolin sodium-dextrose (ANCEF) infusion 2 g  2 g Intravenous Pre-Op/Pre-procedure x 1 dose Mike Carter MD         ceFAZolin (ANCEF) 1 g vial to attach to  ml bag for ADULT or 50 ml bag for PEDS  1 g Intravenous See Admin Instructions Mike Carter MD         lidocaine 1 % 1 mL  1 mL Other Q1H PRN Willow George MD         lidocaine (LMX4) kit   Topical Q1H PRN Willow George MD         sodium chloride (PF) 0.9% PF flush 3 mL  3 mL Intracatheter Q1H PRN Willow George MD         sodium chloride (PF) 0.9% PF flush 3 mL  3 mL Intracatheter Q8H Willow George MD         lactated ringers infusion   Intravenous Continuous Willow George MD         bupivacaine liposome (EXPAREL) 1.3 % LA inj susp 10 mL  10 mL Infiltration DURING SURGERY Davie Tejeda MD         No current  Epic-ordered outpatient prescriptions on file.       PHYSICAL EXAM:    Vitals: T 98.4, P Data Unavailable, /82, R 12, SpO2 98%, Weight   Wt Readings from Last 2 Encounters:   10/11/17 59.5 kg (131 lb 2.8 oz)   17 64 kg (141 lb)       See doc flowsheet    Temp: 36.9  C (98.4  F) Temp  Min: 36.9  C (98.4  F)  Max: 36.9  C (98.4  F)  Resp: 12 Resp  Min: 12  Max: 12  SpO2: 98 % SpO2  Min: 98 %  Max: 98 %    No Data Recorded  Heart Rate: 78 Heart Rate  Min: 78  Max: 78  BP: 140/82 Systolic (24hrs), Av , Min:140 , Max:140   Diastolic (24hrs), Av, Min:82, Max:82      NPO STATUS: see doc flowsheet    LABS:    BMP:  Recent Labs   Lab Test  10/11/17   1046   GLC  89       LFTs:   No results for input(s): PROTTOTAL, ALBUMIN, BILITOTAL, ALKPHOS, AST, ALT, BILIDIRECT in the last 92156 hours.    CBC:   No results for input(s): WBC, RBC, HGB, HCT, MCV, MCH, MCHC, RDW, PLT in the last 41445 hours.    Coags:  No results for input(s): INR, PTT, FIBR in the last 83320 hours.    Imaging:  No orders to display       Jay Carlos MD  Anesthesiology Staff  Pager [phone]    10/11/2017  11:19 AM                  .

## 2017-10-11 NOTE — PROGRESS NOTES
S. Patient was seen today, at [facility] R preop for measurements/delivery of an Ultrasling IV for right shoulder as ordered by Dr. Carter.    O/goal. To reduce motion and help with post-op support    A. At this time, I have provided patient with a size medium Don Leigh Ultrasling IV. Straps were trimmed and adjusted to achieve a custom fit for the patient. Brace was then left at the surgery control desk for physicians to place on the patient in the OR     P. Patient/staffing have been instructed to contact our facility with any future questions and/or concerns.    Lala Ultrasling IV Instructions

## 2017-10-11 NOTE — IP AVS SNAPSHOT
[facility] [address]    [address]    [place] [address]    Phone:  [phone]                                       After Visit Summary   10/11/2017    Autumn Matthews    MRN: [id number]           After Visit Summary Signature Page     I have received my discharge instructions, and my questions have been answered. I have discussed any challenges I see with this plan with the nurse or doctor.    ..........................................................................................................................................  Patient/Patient Representative Signature      ..........................................................................................................................................  Patient Representative Print Name and Relationship to Patient    ..................................................               ................................................  Date                                            Time    ..........................................................................................................................................  Reviewed by Signature/Title    ...................................................              ..............................................  Date                                                            Time

## 2017-10-11 NOTE — LETTER
Transition Communication Hand-off for Care Transitions to Next Level of Care Provider    Name: Autumn Matthews  MRN #: [id number]  Primary Care Provider: AMERICO LANIER     Primary Clinic: [facility] [address] [place] [address]     Reason for Hospitalization:  Cuff Tear Arthroplasty   H/O total shoulder replacement  Admit Date/Time: 10/11/2017  9:58 AM  Expected Discharge Date: 10/14/17  Payor Source: Payor: BCBS / Plan: BCBS PLATINUM BLUE / Product Type: PPO /     Reason for Communication Hand-off Referral: Patient of Dr. Carter    Discharge Plan: home with family support    Concern for non-adherence with plan of care: No     Key Recommendations:  Discharge deferred until tomorrow as patient had increase in nausea this morning. Participating and doing well with inpatient OT and will have family assist upon discharge home.    AVS/Discharge Summary included

## 2017-10-11 NOTE — OR NURSING
PACU to Inpatient Nursing Handoff    Patient Autumn Matthews is a 77 year old female who speaks English.   Procedure Procedure(s):  Right Reverse Total Shoulder Arthroplasty - Wound Class: I-Clean   Surgeon(s) Primary: Mike Carter MD  Assisting: Ericka Wall PA-C     Allergies   Allergen Reactions     Codeine GI Disturbance     PN: LW Reaction: GI Upset     Erythromycin GI Disturbance     PN: LW Reaction: GI Upset     Hydrocodone-Acetaminophen Nausea     PN: nausea     Latex      PN: Converted from LW Latex Sensitivity Flag     Naproxen      PN: LW Reaction: GI Upset     No Clinical Screening - See Comments      PN: LW Other1: -adhesive-rash red     Piroxicam      PN: LW Reaction: GI Upset       Isolation  No active isolations    Past Medical History   has a past medical history of PONV (postoperative nausea and vomiting).    Anesthesia Combined General with Interscalene Block   Dermatome Level     Preop Meds Not applicable   Nerve block Supraclavicular.  Location:right. Med:Exparel (liposomal bupivacaine). Time given: 1130   Intraop Meds dexamethasone (Decadron)  ondansetron (Zofran): last given at 1345   Local Meds No   Antibiotics cefazolin (Ancef) - last given at 1230        Pain Patient Currently in Pain: denies  Comfort: tolerable with discomfort  Pain Control: partially effective   PACU meds  Not applicable   PCA / epidural No   Capnography     Telemetry ECG Rhythm: Normal sinus rhythm      Labs Glucose Lab Results   Component Value Date    GLC 89 10/11/2017       Hgb No results found for: HGB    INR No results found for: INR   PACU Imaging Completed     Wound/Incision Incision/Surgical Site 10/11/17 Right Shoulder (Active)   Incision Assessment UTV 10/11/2017  2:18 PM   Lauren-Incision Assessment UTV 10/11/2017  2:18 PM   Closure Adhesive strip(s);Approximated 10/11/2017  1:55 PM   Dressing Intervention Clean, dry, intact 10/11/2017  2:18 PM   Number of days:0      CMS All Extremities  Temperature: warm  All Extremities Color: no discoloration  All Extremities Sensation: other (see comments) (numbness RUE-pt had a nerve block)   Equipment ice pack and shoulder sling   Other LDA       IV Access     Blood Products Not applicable EBL surg log 250     Intake/Output Date 10/11/17 0700 - 10/12/17 0659   Shift 8117-1182 8778-2990 1856-4644 24 Hour Total   I  N  T  A  K  E   I.V. 1000   1000    Shift Total  (mL/kg) 1000  (16.81)   1000  (16.81)   O  U  T  P  U  T   Urine 65   65    Blood 250   250    Shift Total  (mL/kg) 315  (5.29)   315  (5.29)   Weight (kg) 59.5 59.5 59.5 59.5        Drains / [place] Closed/Suction Drain Right Shoulder Accordion 10 Pakistani (Active)   Site Description [facility] 10/11/2017  2:18 PM   Dressing Status Normal: Clean, Dry & Intact 10/11/2017  2:18 PM   Drainage Appearance Bloody/Bright Red 10/11/2017  2:18 PM   Number of days:0       Urethral Catheter Double-lumen;Straight-tip;Non-latex 16 fr (Active)   Tube Description [facility] 10/11/2017  2:18 PM   Collection Container Standard 10/11/2017  2:18 PM   Securement Method Securing device (Describe) 10/11/2017  2:18 PM   Number of days:0      Time of void PreOp Void Prior to Procedure: 1100 (10/11/17 1044)    PostOp     Bladder Scan     PO    tolerating sips     Vitals    B/P: 95/58  T: 98.1  F (36.7  C)    Temp src: Tympanic  P:       Heart Rate: 90 (10/11/17 1430)     R: 10  O2:  SpO2: 98 %    O2 Device: Nasal cannula    Oxygen Delivery: 3 LPM         Family/support present yes   Patient belongings Patient Belongings: clothing;shoes;glasses;cell phone/electronics  Disposition of Belongings: Other (see comment) (labeled and secured)   Patient transported on bed and air mat   DC meds/scripts (obs/outpt) Not applicable     Special needs/considerations None   Tasks needing completion None       Jenn Morales, RN  [facility] [id number]

## 2017-10-11 NOTE — ANESTHESIA POSTPROCEDURE EVALUATION
Patient: Autumn Matthews    Procedure(s):  Right Reverse Total Shoulder Arthroplasty - Wound Class: I-Clean    Diagnosis:Cuff Tear Arthroplasty   Diagnosis Additional Information: No value filed.    Anesthesia Type:  General, ETT, Peripheral Nerve Block    Note:  Anesthesia Post Evaluation    Patient location during evaluation: PACU  Patient participation: Able to participate in evaluation but full recovery from regional anesthesia has not yet occurred and is not anticipated to occur within 48 hours (shoulder/arm numb 2/2 IS block)  Level of consciousness: awake and alert  Pain management: adequate  Airway patency: patent  Cardiovascular status: acceptable  Respiratory status: acceptable  Hydration status: acceptable  PONV: none     Anesthetic complications: None    Comments: Doing well.        Last vitals:  Vitals:    10/11/17 1130 10/11/17 1418 10/11/17 1430   BP: 112/66 97/69 95/58   Resp: 14 14 10   Temp:  36.7  C (98.1  F)    SpO2: 99% 98% 98%         Electronically Signed By: Sohail Hernandez MD  October 11, 2017  2:56 PM

## 2017-10-11 NOTE — ANESTHESIA CARE TRANSFER NOTE
Patient: Autumn Matthews    Procedure(s):  Right Reverse Total Shoulder Arthroplasty - Wound Class: I-Clean    Diagnosis: Cuff Tear Arthroplasty   Diagnosis Additional Information: No value filed.    Anesthesia Type:   General, ETT, Peripheral Nerve Block     Note:  Airway :Nasal Cannula  Patient transferred to:PACU  Handoff Report: Identifed the Patient, Identified the Reponsible Provider, Reviewed the pertinent medical history, Discussed the surgical course, Reviewed Intra-OP anesthesia mangement and issues during anesthesia, Set expectations for post-procedure period and Allowed opportunity for questions and acknowledgement of understanding      Vitals: (Last set prior to Anesthesia Care Transfer)    CRNA VITALS  10/11/2017 1345 - 10/11/2017 1423      10/11/2017             Pulse: 93    SpO2: 99 %                Electronically Signed By: RICARDA Hein CRNA  October 11, 2017  2:23 PM

## 2017-10-11 NOTE — BRIEF OP NOTE
Brief Operative Note    Pre-operative diagnosis: Rotator cuff tear arthropathy     Post-operative diagnosis:   Same     Procedure:   Right Reverse Total Shoulder Arthroplasty     Surgeon:    Assistant:    MD Ericka Barlow PALIDYA       Anesthesia: ISB with General     Estimated blood loss: *250** cc         Drains: Hemovac     Specimens: Humeral Head --> Discarded       Findings: Arthritis, rct     Complications: None     Condition: Stable     Weight bearing status: Sling at all times.    PT/OT per dictated operative report.           Comments: Dictated by Emma

## 2017-10-11 NOTE — IP AVS SNAPSHOT
MRN:[id number]                      After Visit Summary   10/11/2017    Autumn Matthews    MRN: [id number]           Thank you!     Thank you for choosing [facility] for your care. Our goal is always to provide you with excellent care. Hearing back from our patients is one way we can continue to improve our services. Please take a few minutes to complete the written survey that you may receive in the mail after you visit with us. Thank you!        Patient Information     Date Of Birth          1940        Designated Caregiver       Most Recent Value    Caregiver    Will someone help with your care after discharge? yes    Name of designated caregiver Chani Matthews - daughter    Phone number of caregiver [phone]    Caregiver address [place]       About your hospital stay     You were admitted on:  October 11, 2017 You last received care in the:  UR 8A    You were discharged on:  October 14, 2017        Reason for your hospital stay       You had shoulder surgery                  Who to Call     For medical emergencies, please call 911.  For non-urgent questions about your medical care, please call your primary care provider or clinic, [phone]  For questions related to your surgery, please call your surgery clinic        Attending Provider     Provider Mike Vallejo MD Orthopedics       Primary Care Provider Office Phone # Fax #    Rosette Rouse [phone]       When to contact your care team       Call your physician for fevers greater than 101.5, chills, increased pain, redness, swelling or discharge at the surgical site. During regular business hours call Dr Edwards's office and request to speak with his nurse or the triage nurses. If you see him at [facility] call [phone]. If you see him at [facility] [place] call [phone]. After hours or on weekends call the hospital  at [phone] and ask to speak with the  resident on call.                  After Care Instructions     Activity       Your activity upon discharge: as tolerated, sling at all times. No weight bearing on your affected extremity. May remove sling for hygiene and dressing. Continue with wrist/elbow exercises as taught in the hospital.            Wound care and dressings       You have a brown dressing on which should remain in place 5 days. You may shower over this dressing. After 5 days you may remove it. Underneath there may be steri-strips. Leave these in place. You may shower with your wound un covered as long as it is clean and dry. Do not scrub your wound. You may leave your wound uncovered as long as it is clean and dry. Notify your physician if you notice any drainage.  .                  Follow-up Appointments     Follow Up and recommended labs and tests       Follow up with Dr Carter in two weeks. If you see him Valentina, call the office at [phone] to schedule an appointment if you have not already done so. If you [facility] [place] [phone] to schedule that appoinment..                  Your next 10 appointments already scheduled     Oct 30, 2017 12:50 PM CDT   (Arrive by 12:35 PM)   RETURN SHOULDER with Mike Carter MD   [facility] ([facility] [place])    [address] [place] [address] [phone]            Nov 27, 2017 10:50 AM CST   (Arrive by 10:35 AM)   RETURN SHOULDER with Mike Carter MD   [facility] ([facility])    [address] [place] [address] [phone]              Additional Services     Home Care PT Referral for Hospital Discharge       Please fax discharge orders to [facility]    Skilled home care Physical Therapist for initial home safety evaluation after surgery and treat in home setting per recommendations of the Inpatient Rehabilitation  "Consult Team.                  Pending Results     No orders found from 10/9/2017 to 10/12/2017.            Statement of Approval     Ordered          10/14/17 0950  I have reviewed and agree with all the recommendations and orders detailed in this document.  EFFECTIVE NOW     Approved and electronically signed by:  Jordin Mclean             Admission Information     Date & Time Provider Department Dept. Phone    10/11/2017 Mike Carter MD  8A [phone]      Your Vitals Were     Blood Pressure Pulse Temperature Respirations Height Weight    115/64 77 97.5  F (36.4  C) (Oral) 16 1.6 m (5' 3\") 59.5 kg (131 lb 2.8 oz)    Pulse Oximetry BMI (Body Mass Index)                95% 23.24 kg/m2          MyChart Information     [employer] lets you send messages to your doctor, view your test results, renew your prescriptions, schedule appointments and more. To sign up, go to [email or web address] . Click on \"Log in\" on the left side of the screen, which will take you to the Welcome page. Then click on \"Sign up Now\" on the right side of the page.     You will be asked to enter the access code listed below, as well as some personal information. Please follow the directions to create your username and password.     Your access code is: [email or web address]  Expires: 2017  6:31 AM     Your access code will  in 90 days. If you need help or a new code, please call your [place] clinic or [phone].        Care EveryWhere ID     This is your Care EveryWhere ID. This could be used by other organizations to access your [place] medical records  [id number]        Equal Access to Services     [facility] : Hadii filiberto Abbasi, kun vora, azar garcia . So [facility] [phone].    ATENCIÓN: Si habla español, tiene a pinzon disposición servicios gratuitos de asistencia lingüística. Llame al [phone].    We comply with applicable federal " civil rights laws and [place] laws. We do not discriminate on the basis of race, color, national origin, age, disability, sex, sexual orientation, or gender identity.               Review of your medicines      START taking        Dose / Directions    acetaminophen 325 MG tablet   Commonly known as:  TYLENOL        Dose:  650 mg   Take 2 tablets (650 mg) by mouth every 4 hours as needed for other (surgical pain)   Quantity:  100 tablet   Refills:  0       oxyCODONE 5 MG IR tablet   Commonly known as:  ROXICODONE        Dose:  2.5-5 mg   Take 0.5-1 tablets (2.5-5 mg) by mouth every 4 hours as needed for moderate to severe pain   Quantity:  50 tablet   Refills:  0       senna-docusate 8.6-50 MG per tablet   Commonly known as:  SENOKOT-S;PERICOLACE        Dose:  1-2 tablet   Take 1-2 tablets by mouth 2 times daily as needed for constipation   Quantity:  50 tablet   Refills:  0         CONTINUE these medicines which have NOT CHANGED        Dose / Directions    ADVIL 200 MG tablet   Generic drug:  ibuprofen        Refills:  0       cholecalciferol 1000 UNIT tablet   Commonly known as:  vitamin D        daily (every 24 hours).   Refills:  0       escitalopram 20 MG tablet   Commonly known as:  LEXAPRO        Dose:  20 mg   Take 20 mg by mouth   Refills:  0       ESTRADIOL 0.025/ESTRIOL 0.1 MG/GM CREAM        Estradiol vaginal cream 20mcg/gm.  Apply 0.5gm into vagina two times per week.   Refills:  0       fish oil-omega-3 fatty acids 1000 MG capsule        daily (every 24 hours).   Refills:  0       GABAPENTIN PO        Dose:  300 mg   Take 300 mg by mouth 3 times daily   Refills:  0       loratadine 10 MG tablet   Commonly known as:  CLARITIN        Dose:  10 mg   Take 10 mg by mouth   Refills:  0       MAGNESIUM PO        Refills:  0       MULTIPLE VITAMINS-MINERALS PO        Refills:  0       traZODone 50 MG tablet   Commonly known as:  DESYREL        Dose:  25-50 mg   Take 25-50 mg by mouth   Refills:  0        zolpidem 5 MG tablet   Commonly known as:  AMBIEN        Dose:  5 mg   Take 5 mg by mouth   Refills:  0            Where to get your medicines      These medications were sent to [facility] [place], [place] - [address]  [address], [facility] [address]     Phone:  [phone]     acetaminophen 325 MG tablet    senna-docusate 8.6-50 MG per tablet         Some of these will need a paper prescription and others can be bought over the counter. Ask your nurse if you have questions.     Bring a paper prescription for each of these medications     oxyCODONE 5 MG IR tablet                Protect others around you: Learn how to safely use, store and throw away your medicines at www.disposemymeds.org.             Medication List: This is a list of all your medications and when to take them. Check marks below indicate your daily home schedule. Keep this list as a reference.      Medications           Morning Afternoon Evening Bedtime As Needed    acetaminophen 325 MG tablet   Commonly known as:  TYLENOL   Take 2 tablets (650 mg) by mouth every 4 hours as needed for other (surgical pain)   Last time this was given:  975 mg on 10/14/2017  8:39 AM                                ADVIL 200 MG tablet   Generic drug:  ibuprofen                                cholecalciferol 1000 UNIT tablet   Commonly known as:  vitamin D   daily (every 24 hours).   Last time this was given:  1,000 Units on 10/14/2017  8:40 AM                                escitalopram 20 MG tablet   Commonly known as:  LEXAPRO   Take 20 mg by mouth   Last time this was given:  20 mg on 10/14/2017  8:41 AM                                ESTRADIOL 0.025/ESTRIOL 0.1 MG/GM CREAM   Estradiol vaginal cream 20mcg/gm.  Apply 0.5gm into vagina two times per week.                                fish oil-omega-3 fatty acids 1000 MG capsule   daily (every 24 hours).                                GABAPENTIN PO   Take 300 mg by mouth 3 times  daily   Last time this was given:  300 mg on 10/14/2017  8:41 AM                                loratadine 10 MG tablet   Commonly known as:  CLARITIN   Take 10 mg by mouth   Last time this was given:  10 mg on 10/14/2017  8:41 AM                                MAGNESIUM PO                                MULTIPLE VITAMINS-MINERALS PO                                oxyCODONE 5 MG IR tablet   Commonly known as:  ROXICODONE   Take 0.5-1 tablets (2.5-5 mg) by mouth every 4 hours as needed for moderate to severe pain   Last time this was given:  5 mg on 10/14/2017 12:04 PM                                senna-docusate 8.6-50 MG per tablet   Commonly known as:  SENOKOT-S;PERICOLACE   Take 1-2 tablets by mouth 2 times daily as needed for constipation   Last time this was given:  1 tablet on 10/14/2017  8:42 AM                                traZODone 50 MG tablet   Commonly known as:  DESYREL   Take 25-50 mg by mouth   Last time this was given:  25 mg on 10/13/2017 10:11 PM                                zolpidem 5 MG tablet   Commonly known as:  AMBIEN   Take 5 mg by mouth   Last time this was given:  5 mg on 10/11/2017 10:06 PM

## 2017-10-11 NOTE — LETTER
Transition Communication Hand-off for Care Transitions to Next Level of Care Provider    Name: Autumn Matthews  MRN #: [id number]  Primary Care Provider: AMERICO LANIER     Primary Clinic: [facility] [address] [place] [address]     Reason for Hospitalization:  Cuff Tear Arthroplasty   H/O total shoulder replacement  Admit Date/Time: 10/11/2017  9:58 AM  Discharge Date: In one to two days  Payor Source: Payor: BCBS / Plan: BCBS PLATINUM BLUE / Product Type: PPO /   Discharge Plan:  Home with home PT.  Discharge Needs Assessment:  Needs       Most Recent Value    Equipment Currently Used at Home none    Transportation Available car, family or friend will provide      Follow-up plan:  Future Appointments  Date Time Provider Department [place]   10/14/2017 1:30 PM Tammie Montiel OT [facility]   10/30/2017 12:50 PM Mike Carter MD [facility]   11/27/2017 10:50 AM Mike Carter MD [facility]       Any outstanding tests or procedures:        Referrals     Future Labs/Procedures    Home Care PT Referral for Hospital Discharge     Comments:    Please fax discharge orders to [facility]    Skilled home care Physical Therapist for initial home safety evaluation after surgery and treat in home setting per recommendations of the Inpatient Rehabilitation Consult Team.            Olga Tse, B.S.N., P.H.N.,R.N.         Pager

## 2017-10-11 NOTE — OP NOTE
DATE OF SURGERY:  10/11/2017       PREOPERATIVE DIAGNOSES:  Right shoulder end-stage glenohumeral osteoarthritis with rotator cuff deficiency.      POSTOPERATIVE DIAGNOSIS:  Right shoulder end-stage glenohumeral osteoarthritis with rotator cuff deficiency.      SURGICAL PROCEDURE:  Right shoulder reverse total shoulder arthroplasty.      SURGEON:  Mike Carter MD      ASSISTANT:  Ericak Wall PA-C  The assistance of Ericka Wall was necessitated by the orthopedic complexity of the case, the need to position the arm in space, pass and retrieve sutures.  Additionally, there was no other levels surgically appropriate  available.  Lastly, no other level of surgical assistant would have provided adequate support for the patient's surgical best interest.      ANESTHESIA:  Interscalene blockade plus general endotracheal anesthesia.      ESTIMATED BLOOD LOSS:  150 mL.      IMPLANTS:   1.  Biomet Comprehensive mini baseplate (25 mm) and appropriate screws.   2.  Biomet 41 mm standard glenosphere set to the C position in the 6 o'clock location.   3.  Biomet Comprehensive 12 x 55 mm press-fit humeral stem.   4.  Biomet 44 mm standard tray.   5.  Biomet 44-41 mm standard polyethylene humeral bearing.      INDICATIONS:  Ms. Autumn Matthews is a very pleasant 77-year-old woman with history of pain and disability in her shoulder.  She had an extensive trial of nonsurgical management including activity modification and analgesics.  After discussion of risks and benefits of surgical versus nonsurgical management, she elected to proceed with surgical remediation.      DESCRIPTION OF PROCEDURE:  The patient was positively identified in the preanesthesia care area, her surgical site was initialed by me and her consent was reviewed with her.  She was then taken to the operating theater, she was placed in a well-padded beach chair position and prepped and draped in the usual sterile fashion for right upper  "extremity surgery.      Prior to surgical initiation, a \"timeout\" was held in accordance with hospital policy.  Verbal verification of delivery of prophylactic intravenous antibiotics was performed.      After establishment of a 10 cm anterior deltopectoral incision, I was able to identify the cephalic vein and allowed it to track retract medially.  It was ultimately ligated because of a rent.  I was able to move into the subdeltoid space.  There was significant brisk bleeding down here from the bone and from the soft tissues.  The axillary nerve was identified using the \"tug test.\"  I did this numerous times during the course of the procedure including once just prior to deltopectoral closure verifying that the axillary nerve remained in continuity throughout.      I came down through the subperiosteal space and mobilized the subscapularis.  I amputated the biceps tendon.  It was not visible.  The anterior humerus was eburnated and abnormal in appearance.  There was no superior cuff.      I was able to externally rotate and deliver the humerus into the wound.  I reamed until a size 12 mm reamer had acceptable cortical chatter and then osteotomized the head in 30 degrees of retroversion, used a broach and removed the circumferential osteophyte.  I turned my attention to the glenoid.      I did a circumferential release of the glenoid.  I removed the labrum.  I removed any remaining soft tissue cartilage off the articular surface of the glenoid.  I did a circumferential release of the subscapularis to affect excellent bounce.      I placed the drill guide, I reamed the center hole.  I impacted the definitive baseplate into position after copiously irrigating with antibiotic-impregnated saline.  I filled the center hole followed by the peripheral locking holes.  Excellent purchase was noted with the central hole.      A 41 mm was placed in the above-mentioned trial position and then I assembled the definitive implant " "placed in the same position.  A trial reduction with the above-mentioned implant trial showed 1 mm longitudinal traction and no dislocation with internal or external directed force or internal rotation and anterior superiorly directed force (getting up out of a chair).      Consequently, I placed two #2 FiberWires around the cut neck of the humerus and placed the definitive humeral implant in position and assembled the rest of it on the back table before impacting it.  Reduction showed similar motion profile.      The subscapularis remnant was reattached back to the neck of the humerus using those #2 FiberWires.  A drain was placed proximally to the axillary nerve and the axillary nerve verified to be in continuity using the \"tug test.\"      Ethibonds were used to idalmis the interval proximally and distally in case of need for later revision after copious irrigation.  This was followed by 0 Vicryl, 2-0 Vicryl and 3-0 running subcuticular Monocryl.  Steri-Strips and a sterile nonadherent dressing were applied.  A sling was placed.      POSTOPERATIVE PLAN:   1.  The patient will be discharged home today on oral analgesics.  She should have no active or passive range of motion at this time.   2.  At the 2-week idalmis, she will continue without any active or passive range of motion.   3.  Total sling time will be 6 weeks.   4.  At 6 weeks, her sling will be discontinued and she will begin activities of daily living.   5.  At 3 months, she will have radiographs and be reevaluated clinically to see whether or not she needs the [facility] reverse total shoulder arthroplasty protocol.         RENE BOYD MD             D: 10/11/2017 14:02   T: 10/11/2017 14:30   MT: BERRY      Name:     MEHRAN COLES   MRN:      [id number]        Account:        [id number]   :      1940           Procedure Date: 10/11/2017      Document: [id number]    "

## 2017-10-11 NOTE — ANESTHESIA PROCEDURE NOTES
Peripheral Nerve Block Procedure Note    Staff:     Anesthesiologist:  PENNY MEYER    Resident/CRNA:  POLLO BLACK    Block performed by resident/CRNA in the presence of a teaching physician    Location: Pre-op  Procedure Start/Stop TImes:      10/11/2017 11:40 AM     10/11/2017 11:50 AM    patient identified, IV checked, site marked, risks and benefits discussed, informed consent, monitors and equipment checked, pre-op evaluation, at physician/surgeon's request and post-op pain management      Correct Patient: Yes      Correct Position: Yes      Correct Site: Yes      Correct Procedure: Yes      Correct Laterality:  Yes    Site Marked:  Yes  Procedure details:     Procedure:  Interscalene    Laterality:  Right    Position:  Supine    Sterile Prep: chloraprep, mask and sterile gloves      Local skin infiltration:  None    Needle:  Short bevel and insulated    Needle gauge:  21    Needle length (mm):  110    Ultrasound: Yes      Ultrasound used to identify targeted nerve, plexus, or vascular structure and placed a needle adjacent to it      Permanent Image entered into patiient's record      Abnormal pain on injection: No      Blood Aspirated: No      Paresthesias:  No    Bleeding at site: No      Bolus via:  Needle    Infusion Method:  Single Shot    Complications:  None

## 2017-10-12 ENCOUNTER — APPOINTMENT (OUTPATIENT)
Dept: OCCUPATIONAL THERAPY | Facility: CLINIC | Age: 77
DRG: 483 | End: 2017-10-12
Attending: ORTHOPAEDIC SURGERY
Payer: MEDICARE

## 2017-10-12 LAB
ANION GAP SERPL CALCULATED.3IONS-SCNC: 5 MMOL/L (ref 3–14)
BUN SERPL-MCNC: 17 MG/DL (ref 7–30)
CALCIUM SERPL-MCNC: 8.2 MG/DL (ref 8.5–10.1)
CHLORIDE SERPL-SCNC: 106 MMOL/L (ref 94–109)
CO2 SERPL-SCNC: 30 MMOL/L (ref 20–32)
CREAT SERPL-MCNC: 0.87 MG/DL (ref 0.52–1.04)
GFR SERPL CREATININE-BSD FRML MDRD: 63 ML/MIN/1.7M2
GLUCOSE SERPL-MCNC: 159 MG/DL (ref 70–99)
HGB BLD-MCNC: 12.4 G/DL (ref 11.7–15.7)
POTASSIUM SERPL-SCNC: 4.8 MMOL/L (ref 3.4–5.3)
SODIUM SERPL-SCNC: 141 MMOL/L (ref 133–144)

## 2017-10-12 PROCEDURE — 99231 SBSQ HOSP IP/OBS SF/LOW 25: CPT | Performed by: NURSE PRACTITIONER

## 2017-10-12 PROCEDURE — 25000132 ZZH RX MED GY IP 250 OP 250 PS 637: Mod: GY | Performed by: NURSE PRACTITIONER

## 2017-10-12 PROCEDURE — 85018 HEMOGLOBIN: CPT | Performed by: ORTHOPAEDIC SURGERY

## 2017-10-12 PROCEDURE — 25000128 H RX IP 250 OP 636: Performed by: INTERNAL MEDICINE

## 2017-10-12 PROCEDURE — 97165 OT EVAL LOW COMPLEX 30 MIN: CPT | Mod: GO | Performed by: OCCUPATIONAL THERAPIST

## 2017-10-12 PROCEDURE — A9270 NON-COVERED ITEM OR SERVICE: HCPCS | Mod: GY | Performed by: INTERNAL MEDICINE

## 2017-10-12 PROCEDURE — 25000132 ZZH RX MED GY IP 250 OP 250 PS 637: Mod: GY | Performed by: ORTHOPAEDIC SURGERY

## 2017-10-12 PROCEDURE — A9270 NON-COVERED ITEM OR SERVICE: HCPCS | Mod: GY | Performed by: ORTHOPAEDIC SURGERY

## 2017-10-12 PROCEDURE — 97535 SELF CARE MNGMENT TRAINING: CPT | Mod: GO | Performed by: OCCUPATIONAL THERAPIST

## 2017-10-12 PROCEDURE — 40000133 ZZH STATISTIC OT WARD VISIT: Performed by: OCCUPATIONAL THERAPIST

## 2017-10-12 PROCEDURE — 12000001 ZZH R&B MED SURG/OB UMMC

## 2017-10-12 PROCEDURE — 25000132 ZZH RX MED GY IP 250 OP 250 PS 637: Mod: GY | Performed by: INTERNAL MEDICINE

## 2017-10-12 PROCEDURE — 97530 THERAPEUTIC ACTIVITIES: CPT | Mod: GO | Performed by: OCCUPATIONAL THERAPIST

## 2017-10-12 PROCEDURE — 80048 BASIC METABOLIC PNL TOTAL CA: CPT | Performed by: ORTHOPAEDIC SURGERY

## 2017-10-12 PROCEDURE — A9270 NON-COVERED ITEM OR SERVICE: HCPCS | Mod: GY | Performed by: NURSE PRACTITIONER

## 2017-10-12 PROCEDURE — 99231 SBSQ HOSP IP/OBS SF/LOW 25: CPT | Performed by: INTERNAL MEDICINE

## 2017-10-12 PROCEDURE — 25000125 ZZHC RX 250: Performed by: ORTHOPAEDIC SURGERY

## 2017-10-12 PROCEDURE — 25000128 H RX IP 250 OP 636: Performed by: STUDENT IN AN ORGANIZED HEALTH CARE EDUCATION/TRAINING PROGRAM

## 2017-10-12 RX ORDER — SODIUM CHLORIDE 9 MG/ML
INJECTION, SOLUTION INTRAVENOUS CONTINUOUS
Status: ACTIVE | OUTPATIENT
Start: 2017-10-12 | End: 2017-10-12

## 2017-10-12 RX ORDER — HYDROMORPHONE HYDROCHLORIDE 2 MG/1
1-2 TABLET ORAL EVERY 4 HOURS PRN
Qty: 40 TABLET | Refills: 0 | Status: SHIPPED | OUTPATIENT
Start: 2017-10-12 | End: 2017-10-13

## 2017-10-12 RX ORDER — ACETAMINOPHEN 325 MG/1
650 TABLET ORAL EVERY 4 HOURS PRN
Qty: 100 TABLET | Refills: 0 | Status: SHIPPED | OUTPATIENT
Start: 2017-10-14

## 2017-10-12 RX ORDER — AMOXICILLIN 250 MG
1-2 CAPSULE ORAL 2 TIMES DAILY PRN
Qty: 50 TABLET | Refills: 0 | Status: SHIPPED | OUTPATIENT
Start: 2017-10-12 | End: 2017-11-27

## 2017-10-12 RX ADMIN — Medication 1 MG: at 08:09

## 2017-10-12 RX ADMIN — GABAPENTIN 300 MG: 100 CAPSULE ORAL at 07:55

## 2017-10-12 RX ADMIN — ONDANSETRON 4 MG: 4 TABLET, ORALLY DISINTEGRATING ORAL at 15:16

## 2017-10-12 RX ADMIN — Medication 1 MG: at 22:04

## 2017-10-12 RX ADMIN — GABAPENTIN 300 MG: 100 CAPSULE ORAL at 15:16

## 2017-10-12 RX ADMIN — ACETAMINOPHEN 975 MG: 325 TABLET, FILM COATED ORAL at 17:37

## 2017-10-12 RX ADMIN — VITAMIN D, TAB 1000IU (100/BT) 1000 UNITS: 25 TAB at 07:56

## 2017-10-12 RX ADMIN — Medication 1 MG: at 17:37

## 2017-10-12 RX ADMIN — ESCITALOPRAM OXALATE 20 MG: 20 TABLET ORAL at 09:29

## 2017-10-12 RX ADMIN — ACETAMINOPHEN 975 MG: 325 TABLET, FILM COATED ORAL at 07:55

## 2017-10-12 RX ADMIN — GABAPENTIN 300 MG: 100 CAPSULE ORAL at 20:58

## 2017-10-12 RX ADMIN — CEFAZOLIN SODIUM 2 G: 2 INJECTION, SOLUTION INTRAVENOUS at 06:22

## 2017-10-12 RX ADMIN — SODIUM CHLORIDE: 9 INJECTION, SOLUTION INTRAVENOUS at 08:57

## 2017-10-12 RX ADMIN — SODIUM CHLORIDE: 9 INJECTION, SOLUTION INTRAVENOUS at 21:58

## 2017-10-12 RX ADMIN — Medication 1 MG: at 15:16

## 2017-10-12 RX ADMIN — ONDANSETRON 4 MG: 4 TABLET, ORALLY DISINTEGRATING ORAL at 22:04

## 2017-10-12 RX ADMIN — SENNOSIDES AND DOCUSATE SODIUM 2 TABLET: 8.6; 5 TABLET ORAL at 20:59

## 2017-10-12 RX ADMIN — SENNOSIDES AND DOCUSATE SODIUM 2 TABLET: 8.6; 5 TABLET ORAL at 07:56

## 2017-10-12 RX ADMIN — LORATADINE 10 MG: 10 TABLET ORAL at 07:53

## 2017-10-12 NOTE — PROGRESS NOTES
"Orthopaedic Surgery Progress Note     Dx: Right shoulder end-stage glenohumeral osteoarthritis with rotator cuff deficiency.   Tx: Right shoulder reverse total shoulder arthroplasty.     E: No acute events overnight.    S:  Pt give Ambien and dilaudid together last evening. Slept most of shift. Painful and lightly nauseated today.    O:   /56  Pulse 80  Temp 96.8  F (36  C) (Axillary)  Resp 16  Ht 1.6 m (5' 3\")  Wt 59.5 kg (131 lb 2.8 oz)  SpO2 93%  BMI 23.24 kg/m2    Drain: 0/40    Exam:  Gen: NAD, A/O x 3  Resp: Comfortable, non-labored breathing  abd soft, non tender  RUE:  -Wound dressed, c/d/i  -Sens: SILT m/r/u/ax  -Motor: 3/5 , io, epl, fpl  -Vasc: 2+ pulses, wwp, brisk cap refill      Recent Labs  Lab 10/12/17  0625 10/11/17  1738 10/11/17  1046    139  --    POTASSIUM 4.8 3.9  --    CHLORIDE 106 105  --    CO2 30 28  --    BUN 17 18  --    CR 0.87 1.00  --    * 196* 89       Recent Labs  Lab 10/12/17  0625   HGB 12.4          Impression: 77 year old female s/p R Reverse total shoulder arthroplasty  on 10-  doing well    Plan:  - Activity: up as tolerated  - Antibiotics: complete  - Diet: as tolerated  - DVT prophylaxis: mechanical only  - Wound Care: aquacel  - Drain: will d/c when less than 30 ml   - Pain management: IV/PO/PNB  - Occupational Therapy:  ADL's  Sling care.  She should have no active or passive range of motion at this time.   2.  At the 2-week idalmis, she will continue without any active or passive range of motion.    - Dispo: 1-2 days pending progress with pain control, therapy  - Follow up: 2 weeks with Dr Carter  Future Appointments  Date Time Provider Department Center   10/12/2017 1:00 PM Deepika Ny, OT UROT [place]   10/30/2017 12:50 PM Mike Carter MD [facility]   11/27/2017 10:50 AM Mike Carter MD [facility]        RICARDA Brandon, CNP  Department of Orthopedic Surgery  [facility]  [phone]    Dr Carter " updated.      For any questions regarding this patient please page me at the above number prior to contacting the ortho resident on call.

## 2017-10-12 NOTE — PLAN OF CARE
Problem: Shoulder Arthroplasty (Adult)  Goal: Signs and Symptoms of Listed Potential Problems Will be Absent, Minimized or Managed (Shoulder Arthroplasty)  Signs and symptoms of listed potential problems will be absent, minimized or managed by discharge/transition of care (reference Shoulder Arthroplasty (Adult) CPG).   Outcome: Improving  Pt arrived on unit at 1530 on bed.         VS:    Slight hypotension 80s/50s when pt arrived on floor, Radha notified and bolus given. BP went up to 100s/50s. Pt states baseline is around 110/70s. Other VSS, 1LPM O2 on.   Output:    Due to void around 0000. Pt arrived with hinton in, pulled POD 0 around 2000, 450mL in catheter when pulled. Last BM 10/11 AM before surgery   Activity:    Up with assist of 1, dangled at edge of bed. Pt was standing for a little while with no dizziness noted.   Skin: Incision on RUE, some bruising and redness blanchable   Pain:    Reports little to no pain, pt states discomfort but block is still in effect. 2mg Dilaudid started to stay on top of the pain.   CMS:    Pt reports numbness in RUE but denies any other numbness or tingling. Pt is able to move her fingers on right hand.   Dressing:    CDI   Diet:    Clear and full liquids well tolerated, no N/V reported. Advanced her to a regular diet for morning.   LDA:    PIV left, infusing 100mL/hr.  Hemovac drain in right shoulder.   Equipment:    Capnography, PCDs on while in bed, O2 on 1LPM.   Plan:    Continue to monitor.   Additional Info:    Pt likes to take Ambien before bedtime. She has a hx of postoperative N/V but none reported thus far. Call light is within reach and calls appropriately.

## 2017-10-12 NOTE — PLAN OF CARE
Problem: Patient Care Overview  Goal: Plan of Care/Patient Progress Review  Discharge Planner OT   Patient plan for discharge: Home with assist  Current status: Patient limited by fatigue, dizziness and nausea.  Patient is oriented and pleasant.  SBA for bed mobility, CGA for ambulation to/from bathroom.  Became very dizzy and nauseated when up on toilet.  BP 96/57 upon return to bed.  Max A for UB dressing and sling management today.    Barriers to return to prior living situation: Will continue to assess  Recommendations for discharge: Hope to be able to return home, patient does live alone but has daughter coming in tomorrow 10/13 and staying one week  Rationale for recommendations: Patient will continue OT 2x/day x 2-4 days for maximum independence with ADLs/mobility; no inpatient PT needs identified       Entered by: Deepika Ny 10/12/2017 9:23 AM

## 2017-10-12 NOTE — PLAN OF CARE
Problem: Patient Care Overview  Goal: Plan of Care/Patient Progress Review  PT orders received for PT evaluation and treatment: s/p TSA.  Per OT pt does not have any skilled PT needs, PT orders completed.

## 2017-10-12 NOTE — PROGRESS NOTES
10/12/17 0812   Quick Adds   Type of Visit Initial Occupational Therapy Evaluation   Living Environment   Lives With alone   Living Arrangements house   Number of Stairs to Enter Home 2   Number of Stairs Within Home 0   Transportation Available car;family or friend will provide   Living Environment Comment Has a lower level but does not need to use; has jacuzzi tub and walk-in shower   Self-Care   Dominant Hand right   Usual Activity Tolerance moderate   Current Activity Tolerance fair   Equipment Currently Used at Home none   Activity/Exercise/Self-Care Comment Patient independent in self-cares/mobility without AE; reports increased difficulty with dressing.  Patient has been enjoying water aerobics.   Functional Level Prior   Ambulation 0-->independent   Transferring 0-->independent   Toileting 0-->independent   Bathing 0-->independent   Dressing 0-->independent   Eating 0-->independent   Communication 0-->understands/communicates without difficulty   Swallowing 0-->swallows foods/liquids without difficulty   Cognition 0 - no cognition issues reported   Fall history within last six months yes   Number of times patient has fallen within last six months 1   Prior Functional Level Comment Patient independent in self-cares/mobility without AE; reports increased difficulty with dressing.  Patient has been enjoying water aerobics.   General Information   Onset of Illness/Injury or Date of Surgery - Date 10/11/17   Referring Physician Dr. Carter   Patient/Family Goals Statement return home to baseline   Additional Occupational Profile Info/Pertinent History of Current Problem POD #1 s/p R reverse TSA   Precautions/Limitations other (see comments)  (reverse protocol- no PROM/AROM of shoulder)   Weight-Bearing Status - RUE nonweight-bearing   General Observations On 1.5L O2   Cognitive Status Examination   Cognitive Comment No cognitive concerns   Sensory Examination   Sensory Comments Reports numbness in R UE   Pain  Assessment   Patient Currently in Pain Yes, see Vital Sign flowsheet   Range of Motion (ROM)   ROM Comment L UE AROM WFL; no ROM of R UE   Strength   Strength Comments MMT not tested/impaired due to recent surgery   Mobility   Bed Mobility Bed mobility skill: Sit to supine;Bed mobility skill: Supine to sit   Bed Mobility Skill: Sit to Supine   Level of [place]: Sit/Supine stand-by assist   Bed Mobility Skill: Supine to Sit   Level of [place]: Supine/Sit stand-by assist   Transfer Skill: Bed to Chair/Chair to Bed   Level of [place]: Bed to Chair contact guard   Transfer Skill: Sit to Stand   Level of [place]: Sit/Stand contact guard   Transfer Skill: Toilet Transfer   Level of [place]: Toilet contact guard   Bathing   Level of [place] unable to perform   Upper Body Dressing   Level of [place]: Dress Upper Body maximum assist (25% patients effort)   Lower Body Dressing   Level of [place]: Dress Lower Body maximum assist (25% patients effort)   Toileting   Level of [place]: Toilet minimum assist (75% patients effort)   Grooming   Level of [place]: Grooming stand-by assist   Eating/Self Feeding   Level of [place]: Eating stand-by assist   Activities of Daily Living Analysis   Impairments Contributing to Impaired Activities of Daily Living pain;post surgical precautions;ROM decreased;strength decreased   General Therapy Interventions   Planned Therapy Interventions ADL retraining   Clinical Impression   Criteria for Skilled Therapeutic Interventions Met yes, treatment indicated   OT Diagnosis impaired self-cares/mobility   Influenced by the following impairments pain; decreased ROM   Assessment of Occupational Performance 1-3 Performance Deficits   Identified Performance Deficits dressing, bathing, toileting   Clinical Decision Making (Complexity) Low complexity   Therapy Frequency 2 times/day   Predicted Duration of Therapy Intervention (days/wks) 2-3 days  "  Anticipated Discharge Disposition Home with Assist   Risks and Benefits of Treatment have been explained. Yes   Patient, Family & other staff in agreement with plan of care Yes   [facility] TM \"6 Clicks\"   2016, Trustees of [facility], under license to [employer].  All rights reserved.   6 Clicks Short Forms Daily Activity Inpatient Short Form   [facility]  \"6 Clicks\" Daily Activity Inpatient Short Form   1. Putting on and taking off regular lower body clothing? 2 - A Lot   2. Bathing (including washing, rinsing, drying)? 2 - A Lot   3. Toileting, which includes using toilet, bedpan or urinal? 2 - A Lot   4. Putting on and taking off regular upper body clothing? 2 - A Lot   5. Taking care of personal grooming such as brushing teeth? 3 - A Little   6. Eating meals? 3 - A Little   Daily Activity Raw Score (Score out of 24.Lower scores equate to lower levels of function) 14   Total Evaluation Time   Total Evaluation Time (Minutes) 8     "

## 2017-10-12 NOTE — PLAN OF CARE
Problem: Patient Care Overview  Goal: Individualization & Mutuality  Pt A/O X 4. Afebrile. VSS. Is on CAPNOGRAPHY until 1545PM today. Pt stood at side of bed with Physical Therapy this AM and pt became light-headed and systolic blood pressure in the 90's, NP Radha Jacobs notified in person. Lungs- Clear bilaterally with both anterior and posterior. IS encouraged. Bowels- Hyperactive in all four quadrants. CMS and Neuro's are intact. Denies numbness and tingling in all extremities. Denies nausea, shortness of breath, and chest pain. Has pain in the right shoulder and given 1mg PO Dilaudid, ICE applied, and is tolerating well. Voids spontaneously without difficulty in the bathroom. Is on a Regular diet and appetite was Good this shift. Right shoulder incisional dressing is C/D/I with a sling/immobilizer in place. Hemovac is patent with 70ml's serosanguinous drainage, new hemovac bag applied. Pt up in room with Assist X 1 and a gait belt. PIV is patent in the left hand and saline locked. SERGIO's and PCD's on BLE's. Bilateral heels are elevated off the bed. Pt is able to make needs known and the call light is within the pt's reach. Continue to monitor.

## 2017-10-12 NOTE — DISCHARGE SUMMARY
[facility] Orthopaedic Surgery Discharge Summary    Autumn Matthews MRN# [id number]   Age: 77 year old YOB: 1940       Date of Admission:  10/11/2017  Date of Discharge::  10/14/2017   Admitting Physician:  Mike Carter MD  Discharge To:  Home   Primary Care Physician:      Rosette Rouse          Admission Diagnoses:   R shoulder end-stage glenohumeral osteoarthritis with rotator cuff deficiency.          Discharge Diagnosis:   Same         Procedures Performed:   R shoulder reverse total shoulder arthroplasty.         Consultations:   ORTHOSIS EXTREMITY UPPER REFERRAL IP CONSULT  OCCUPATIONAL THERAPY ADULT IP CONSULT  PHYSICAL THERAPY ADULT IP CONSULT  INTERNAL MEDICINE ADULT IP CONSULT FOR [facility]          Brief History:    Ms. Autumn Matthesw is a very pleasant 77-year-old woman with history of pain and disability in her shoulder.  She had an extensive trial of nonsurgical management including activity modification and analgesics.  After discussion of risks and benefits of surgical versus nonsurgical management, she elected to proceed with surgical remediation           Hospital Course:   Patient did well post operatively.  Transitioned from iv medication to oral meds.  Tolerated diet, resumed normal bowel and bladder function.  Was seen by PT/OT prior to discharge.           Medications Prior to Admission:     Prescriptions Prior to Admission   Medication Sig Dispense Refill Last Dose     GABAPENTIN PO Take 300 mg by mouth 3 times daily    10/11/2017 at 0800     ESTRADIOL 0.025/ESTRIOL 0.1 MG/GM CREAM Estradiol vaginal cream 20mcg/gm.  Apply 0.5gm into vagina two times per week.   Past Week at Unknown time     escitalopram (LEXAPRO) 20 MG tablet Take 20 mg by mouth   10/11/2017 at 0800     loratadine (CLARITIN) 10 MG tablet Take 10 mg by mouth   10/11/2017 at 0800     fish oil-omega-3 fatty acids 1000 MG capsule daily (every 24 hours).   10/9/2017 at Unknown time      traZODone (DESYREL) 50 MG tablet Take 25-50 mg by mouth   10/10/2017 at 2100     zolpidem (AMBIEN) 5 MG tablet Take 5 mg by mouth   Past Month at Unknown time     cholecalciferol (VITAMIN D3) 1000 UNIT tablet daily (every 24 hours).   10/9/2017     MAGNESIUM PO    10/9/2017     MULTIPLE VITAMINS-MINERALS PO    10/9/2017     ibuprofen (ADVIL) 200 MG tablet    10/8/2017            Discharge Medications:        Review of your medicines      START taking       Dose / Directions    acetaminophen 325 MG tablet   Commonly known as:  TYLENOL   Used for:  History of total shoulder replacement, unspecified laterality        Dose:  650 mg   Take 2 tablets (650 mg) by mouth every 4 hours as needed for other (surgical pain)   Quantity:  100 tablet   Refills:  0       oxyCODONE 5 MG IR tablet   Commonly known as:  ROXICODONE   Used for:  History of total shoulder replacement, unspecified laterality        Dose:  2.5-5 mg   Take 0.5-1 tablets (2.5-5 mg) by mouth every 4 hours as needed for moderate to severe pain   Quantity:  50 tablet   Refills:  0       senna-docusate 8.6-50 MG per tablet   Commonly known as:  SENOKOT-S;PERICOLACE   Used for:  History of total shoulder replacement, unspecified laterality        Dose:  1-2 tablet   Take 1-2 tablets by mouth 2 times daily as needed for constipation   Quantity:  50 tablet   Refills:  0         CONTINUE these medicines which have NOT CHANGED       Dose / Directions    ADVIL 200 MG tablet   Generic drug:  ibuprofen        Refills:  0       cholecalciferol 1000 UNIT tablet   Commonly known as:  vitamin D        daily (every 24 hours).   Refills:  0       escitalopram 20 MG tablet   Commonly known as:  LEXAPRO        Dose:  20 mg   Take 20 mg by mouth   Refills:  0       ESTRADIOL 0.025/ESTRIOL 0.1 MG/GM CREAM        Estradiol vaginal cream 20mcg/gm.  Apply 0.5gm into vagina two times per week.   Refills:  0       fish oil-omega-3 fatty acids 1000 MG capsule        daily (every 24  hours).   Refills:  0       GABAPENTIN PO        Dose:  300 mg   Take 300 mg by mouth 3 times daily   Refills:  0       loratadine 10 MG tablet   Commonly known as:  CLARITIN        Dose:  10 mg   Take 10 mg by mouth   Refills:  0       MAGNESIUM PO        Refills:  0       MULTIPLE VITAMINS-MINERALS PO        Refills:  0       traZODone 50 MG tablet   Commonly known as:  DESYREL        Dose:  25-50 mg   Take 25-50 mg by mouth   Refills:  0       zolpidem 5 MG tablet   Commonly known as:  AMBIEN        Dose:  5 mg   Take 5 mg by mouth   Refills:  0            Where to get your medicines      These medications were sent to [place], [place] - [address]  [address] [place] [address], [facility] [address]     Phone:  [phone]      acetaminophen 325 MG tablet     senna-docusate 8.6-50 MG per tablet         Some of these will need a paper prescription and others can be bought over the counter. Ask your nurse if you have questions.     Bring a paper prescription for each of these medications      oxyCODONE 5 MG IR tablet                     Pending Results at Discharge:   None         Discharge Instructions:     Discharge Procedure Orders  Reason for your hospital stay   Order Comments: You had shoulder surgery     Follow Up and recommended labs and tests   Order Comments: Follow up with Dr Carter in two weeks. If you see him [facility], call the office at [phone] to schedule an appointment if you have not already done so. If you see him at [facility] call [phone] to schedule that appoinment..     When to contact your care team   Order Comments: Call your physician for fevers greater than 101.5, chills, increased pain, redness, swelling or discharge at the surgical site. During regular business hours call Dr Edwards's office and request to speak with his nurse or the triage nurses. If you see him at [facility] call [phone]. If you see him at [facility] call [phone].  After hours or on weekends call the hospital  at [phone] and ask to speak with the resident on call.     Wound care and dressings   Order Comments: You have a brown dressing on which should remain in place 5 days. You may shower over this dressing. After 5 days you may remove it. Underneath there may be steri-strips. Leave these in place. You may shower with your wound un covered as long as it is clean and dry. Do not scrub your wound. You may leave your wound uncovered as long as it is clean and dry. Notify your physician if you notice any drainage.  .     Activity   Order Comments: Your activity upon discharge: as tolerated, sling at all times. No weight bearing on your affected extremity. May remove sling for hygiene and dressing. Continue with wrist/elbow exercises as taught in the hospital.   Order Specific Question Answer Comments   Is discharge order? Yes        Future Appointments  Date Time Provider Department Center   10[facility]2017 1:00 PM [facility]   10/13/2017 8:30 AM Deepika Ny  [facility] [place]   10/13/2017 1:00 PM Deepika Ny OT [facility] [place]   10/30/2017 12:50 PM Mike Carter MD [facility]   11/27/2017 10:50 AM Mike Carter MD [facility]       Radha Jacobs APRN, CNP  Department of Orthopedic Surgery  [facility]  [phone]

## 2017-10-12 NOTE — PROGRESS NOTES
"REGIONAL ANESTHESIA PAIN SERVICE  SUBJECTIVE: Bedside nurse reports pt sleepy overnight after taking oral Dilaudid and Ambien at HS, more awake and alert this AM.  Pt states normally at home she takes trazodone Q HS and Ambien PRN at HS.  Last dose Dilaudid given at 2200.  Pt currently reports feels sensation returning in her upper arm and pain is increasing, rating pain 7/10 at rest and with movement.  Denies any weakness, paresthesias, circumoral numbness, metallic taste or tinnitus.  Dizzy and lightheaded when up to bathroom.  Tolerating sips of water with meds.         Medications related to Pain Management (Future)    Start     Dose/Rate Route Frequency Ordered Stop    10/14/17 0000  acetaminophen (TYLENOL) tablet 650 mg      650 mg Oral EVERY 4 HOURS PRN 10/11/17 1539      10/11/17 2000  senna-docusate (SENOKOT-S;PERICOLACE) 8.6-50 MG per tablet 1-2 tablet      1-2 tablet Oral 2 TIMES DAILY 10/11/17 1539      10/11/17 1824  zolpidem (AMBIEN) tablet 5 mg      5 mg Oral AT BEDTIME PRN 10/11/17 1824      10/11/17 1715  HYDROmorphone (DILAUDID) half-tab 1-2 mg      1-2 mg Oral EVERY 3 HOURS PRN 10/11/17 1703      10/11/17 1702  HYDROmorphone (DILAUDID) injection 0.2-0.4 mg      0.2-0.4 mg Intravenous EVERY 2 HOURS PRN 10/11/17 1703      10/11/17 1539  lidocaine (LMX4) kit       Topical EVERY 1 HOUR PRN 10/11/17 1539      10/11/17 1445  gabapentin (NEURONTIN) capsule 300 mg      300 mg Oral 3 TIMES DAILY 10/11/17 1438      10/11/17 1445  acetaminophen (TYLENOL) tablet 975 mg      975 mg Oral EVERY 8 HOURS 10/11/17 1438 10/14/17 1559    10/11/17 1438  bupivacaine liposome (EXPAREL) LONG ACTING injection was administered into the infiltration site to produce postsurgical analgesia. Duration of action is up to 72 hours, and other \"randy\" medications should not be given for 96 hours with the exception of the lidocaine 5% patch (LIDODERM) and the lidocaine 10mg in potassium infusions. This entry is for INFORMATION ONLY. " "      Does not apply CONTINUOUS PRN 10/11/17 1438 10/15/17 1437          OBJECTIVE:    Blood pressure 114/56, pulse 80, temperature 96.8  F (36  C), temperature source Axillary, resp. rate 16, height 1.6 m (5' 3\"), weight 59.5 kg (131 lb 2.8 oz), SpO2 93 %.  Strength 4/5 RUE, 5/5 LUE      ASSESSMENT/PLAN:    Autumn Matthews is a 77 year old female with Cuff Tear Arthroplasty, now POD #1 s/p  REVERSE ARTHROPLASTY SHOULDER with single shot right interscalene nerve block injection.  Total bupivacaine 0.25% with epinephrine 1:200,000 10 mL, then liposome bupivacaine (Exparel) 1.3% 10mL administered 10/11/17 for postop pain control.  Pt is ambulating without difficulty.  No weakness or paresthesias.  No evidence of adverse side effects associated with nerve block injection.  Pt acheiving adequate pain control, with nerve block and oral analgesics, synergistic effect and sedation occurred with combined opioid + ambien use.  Anticipate up to 72 hours of pain control with long-acting local anesthetic. Additionally, pt will continue to require opioid/nonopioid analgesics for visceral and muscle pain not controlled with local anesthetic.      - NO other local anesthetic use within 96 hours of liposome bupivacaine (Exparel)  - patient received verbal and written instructions about liposome bupivacaine and counseling about pharmacologic and nonpharmacologic measures for acute postoperative pain management.  - HOLD ambien while on opioid analgesics.  Should not take opioid within 2 hrs of trazodone at HS.  Pt instructed to not take ambien while on opioid during course of healing.  This should be reinforced at time of discharge.    - please call if questions or concerns      RICARDA Sandoval [facility]  Regional Anesthesia Pain Service  10/12/2017 7:27 AM    24 hour Job Code Pager.  For in-house use only.     Dial * * [phone] and  [place]:  [phone]  [place]: [phone]  Peds:  [phone]  Enter call-back number  May text page using " [employer], but NOT [employer].

## 2017-10-12 NOTE — CONSULTS
INTERNAL MEDICINE CONSULTATION      ASSESSMENT:   1.  Status post right shoulder reverse total shoulder arthroplasty for the treatment of osteoarthritis.  The patient is doing well overall postop.   2.  Hypotension, likely secondary to volume depletion and to effects of anesthesia.  The patient appears comfortable.   3.  History of hemochromatosis with preoperative hemoglobin 13.9.  The patient has not required phlebotomy for several years.   4.  No known history of coronary artery disease nor of deep venous thrombosis or pulmonary embolism.      RECOMMENDATIONS:  Routine postop labs are ordered.  The patient has been given a fluid bolus.  Blood pressure will need to be closely monitored.  Her baseline blood pressure is approximately 110 systolically, so I would expect her blood pressure postoperatively should be around  systolically.  A hemoglobin will be repeated, should she experience persistent hypotension to rule out significant postoperative bleeding, though I think this is unlikely.  DVT prophylaxis will be with mechanical, per Dr. Carter's protocol.      Thank you for having me see this patient.      DISCUSSION:  Atuumn Matthews is a pleasant 77-year-old female who underwent a right shoulder reverse total shoulder arthroplasty by Dr. Carter today for the treatment of osteoarthritis of the shoulder.  I have been asked to see her by Dr. Carter to assess hypotension.  The patient's blood pressure postoperatively, upon arrival to the orthopedic unit, was 87/53.  Blood pressures in the recovery room previously had been in the 90s to 130s systolically.  The patient denies feeling dizzy or lightheaded.  She denies chest pain, shortness of breath, abdominal pain.  She has minimal sensation in her right arm secondary to interscalene block.      PAST MEDICAL HISTORY: Remarkable for:    1.  Known osteoarthritis of the shoulder.   2.  History of bilateral total knee arthroplasty without complications.   3.   Hemochromatosis for which the patient has received phlebotomy in the past, none for the last several years.  Hemoglobin preoperatively was 13.9.   4.  Hyperlipidemia.      PAST SURGICAL HISTORY:  Includes bilateral knee arthroplasties, cataract repair, breast biopsy and blepharoplasty.      FAMILY HISTORY:  Remarkable for a daughter with hemochromatosis.      PREOPERATIVE MEDICATIONS:   1.  Vitamin D3 at 1000 units daily.   2.  Estradiol cream twice weekly.   3.  Lexapro 10 mg daily.   4.  Gabapentin 100-300 mg 3 times a day.   5.  Claritin 10 mg a day.   6.  Magnesium 250 mg daily.      ALLERGIES:  Codeine, which caused GI upset; hydrocodone caused GI upset; Naprosyn caused GI upset; erythromycin caused GI upset.      SOCIAL HISTORY:  The patient lives by herself.  She is single.  She denies alcohol or cigarette use.      REVIEW OF SYSTEMS:  She denies exertional chest pain, shortness of breath.  She denies fevers, chills, cough, abdominal pain, rash, nausea, vomiting.      PHYSICAL EXAMINATION:   GENERAL:  She is a very pleasant female who appears well.  She is alert, fully oriented.   VITAL SIGNS:  Blood pressure most recently was 87/53.  She is in the midst of a fluid bolus.   HEENT:  Facies are symmetric.   NECK:  Supple.  There is no carotid bruit.  There is no thyromegaly.   LUNGS:  Clear.   CARDIAC:  Reveals a regular rate and rhythm without murmurs.   ABDOMEN:  Soft, nontender, nondistended.   EXTREMITIES:  Reveal no lower extremity edema.   NEUROLOGIC:  She is alert, fully oriented and quite pleasant.  Cranial nerves are intact.   SKIN:  Reveals no lesions.      LABORATORY DATA:  Preoperative EKG was normal.  Preoperative hemoglobin was 13.9.  I am not able to find a preoperative basic metabolic panel.         JOSE TUBBS MD             D: 10/11/2017 17:15   T: 10/11/2017 18:53   MT: CC      Name:     MEHRAN COLES   MRN:      [id number]        Account:       [id number]   :      1940            Consult Date:  10/11/2017      Document: [id number]

## 2017-10-12 NOTE — PROVIDER NOTIFICATION
NP Radha Jacobs notified in person that pt was getting up out of bed with OT Sonja and then the pt started feeling light headed, Systolic blood pressure in the 90's and pt started feeling nauseas. Instructed by Radha to give Zofran and monitor pt.

## 2017-10-12 NOTE — PLAN OF CARE
Problem: Patient Care Overview  Goal: Plan of Care/Patient Progress Review  Outcome: Improving  A/Ox's 4. Pt sleepy after Ambien and Dilaudid given at 2206. Pt fell asleep at 0015 and slept the balance of the shift.  Pt rated pain as tolerable. Dilaudid given for pain control. Dressing CDI. Pt reported numbness, CMS otherwise intact. Tolerated regular diet. Denied any nausea, CP, SOB, lightheadedness or dizziness. Voiding without pain or difficulty post hinton removal. Up with assist of one. Encouraged increased/continued IS use. Bilateral heels elevated off bed. Resting in bed at this time with call light in reach. Able to make needs known. Continue to monitor.

## 2017-10-12 NOTE — PROGRESS NOTES
Pt feels tired this am, attributes this to po Dilaudid (+ambien last night)    R arm pain is controlled  Mild nausea earlier, improved  No chest pain or SOB    VSS  BP 90s-110s/  HR 80s  02 sats mid 90s 2 LPM    Alert, fully oriented  Lungs clear  CV rrr  Abd soft  No LE edema    BMP nl  Hgb 12.4      Assessment    S/p R reverse TSA, Pt is doing well    Lethargy, likely med related, improved this am    Hx hemachromatosis, stable    Plan  Reduce dose of Dilaudid  IS  Mechanical DVT proph

## 2017-10-13 ENCOUNTER — APPOINTMENT (OUTPATIENT)
Dept: OCCUPATIONAL THERAPY | Facility: CLINIC | Age: 77
DRG: 483 | End: 2017-10-13
Attending: ORTHOPAEDIC SURGERY
Payer: MEDICARE

## 2017-10-13 LAB
ANION GAP SERPL CALCULATED.3IONS-SCNC: 3 MMOL/L (ref 3–14)
BUN SERPL-MCNC: 17 MG/DL (ref 7–30)
CALCIUM SERPL-MCNC: 8.2 MG/DL (ref 8.5–10.1)
CHLORIDE SERPL-SCNC: 105 MMOL/L (ref 94–109)
CO2 SERPL-SCNC: 30 MMOL/L (ref 20–32)
CREAT SERPL-MCNC: 0.96 MG/DL (ref 0.52–1.04)
GFR SERPL CREATININE-BSD FRML MDRD: 56 ML/MIN/1.7M2
GLUCOSE SERPL-MCNC: 100 MG/DL (ref 70–99)
HGB BLD-MCNC: 10.5 G/DL (ref 11.7–15.7)
POTASSIUM SERPL-SCNC: 3.8 MMOL/L (ref 3.4–5.3)
SODIUM SERPL-SCNC: 138 MMOL/L (ref 133–144)

## 2017-10-13 PROCEDURE — 25000128 H RX IP 250 OP 636: Performed by: ORTHOPAEDIC SURGERY

## 2017-10-13 PROCEDURE — 25000132 ZZH RX MED GY IP 250 OP 250 PS 637: Mod: GY | Performed by: INTERNAL MEDICINE

## 2017-10-13 PROCEDURE — 80048 BASIC METABOLIC PNL TOTAL CA: CPT | Performed by: ORTHOPAEDIC SURGERY

## 2017-10-13 PROCEDURE — 97530 THERAPEUTIC ACTIVITIES: CPT | Mod: GO | Performed by: OCCUPATIONAL THERAPIST

## 2017-10-13 PROCEDURE — 12000001 ZZH R&B MED SURG/OB UMMC

## 2017-10-13 PROCEDURE — 25000132 ZZH RX MED GY IP 250 OP 250 PS 637: Mod: GY | Performed by: ORTHOPAEDIC SURGERY

## 2017-10-13 PROCEDURE — A9270 NON-COVERED ITEM OR SERVICE: HCPCS | Mod: GY | Performed by: NURSE PRACTITIONER

## 2017-10-13 PROCEDURE — 25000132 ZZH RX MED GY IP 250 OP 250 PS 637: Mod: GY | Performed by: NURSE PRACTITIONER

## 2017-10-13 PROCEDURE — A9270 NON-COVERED ITEM OR SERVICE: HCPCS | Mod: GY | Performed by: ORTHOPAEDIC SURGERY

## 2017-10-13 PROCEDURE — A9270 NON-COVERED ITEM OR SERVICE: HCPCS | Mod: GY | Performed by: INTERNAL MEDICINE

## 2017-10-13 PROCEDURE — 36415 COLL VENOUS BLD VENIPUNCTURE: CPT | Performed by: ORTHOPAEDIC SURGERY

## 2017-10-13 PROCEDURE — 97535 SELF CARE MNGMENT TRAINING: CPT | Mod: GO | Performed by: OCCUPATIONAL THERAPIST

## 2017-10-13 PROCEDURE — 99231 SBSQ HOSP IP/OBS SF/LOW 25: CPT | Performed by: INTERNAL MEDICINE

## 2017-10-13 PROCEDURE — 40000133 ZZH STATISTIC OT WARD VISIT: Performed by: OCCUPATIONAL THERAPIST

## 2017-10-13 PROCEDURE — 85018 HEMOGLOBIN: CPT | Performed by: ORTHOPAEDIC SURGERY

## 2017-10-13 RX ORDER — OXYCODONE HYDROCHLORIDE 5 MG/1
2.5-5 TABLET ORAL EVERY 4 HOURS PRN
Qty: 50 TABLET | Refills: 0 | Status: SHIPPED | OUTPATIENT
Start: 2017-10-13 | End: 2017-11-27

## 2017-10-13 RX ORDER — OXYCODONE HYDROCHLORIDE 5 MG/1
5 TABLET ORAL EVERY 4 HOURS PRN
Status: DISCONTINUED | OUTPATIENT
Start: 2017-10-13 | End: 2017-10-13

## 2017-10-13 RX ORDER — OXYCODONE HYDROCHLORIDE 5 MG/1
5 TABLET ORAL EVERY 4 HOURS PRN
Qty: 60 TABLET | Refills: 0 | Status: SHIPPED | OUTPATIENT
Start: 2017-10-13 | End: 2017-10-13

## 2017-10-13 RX ADMIN — OXYCODONE HYDROCHLORIDE 2.5 MG: 5 TABLET ORAL at 14:27

## 2017-10-13 RX ADMIN — PROCHLORPERAZINE EDISYLATE 5 MG: 5 INJECTION INTRAMUSCULAR; INTRAVENOUS at 06:27

## 2017-10-13 RX ADMIN — GABAPENTIN 300 MG: 100 CAPSULE ORAL at 14:27

## 2017-10-13 RX ADMIN — ACETAMINOPHEN 975 MG: 325 TABLET, FILM COATED ORAL at 16:02

## 2017-10-13 RX ADMIN — OXYCODONE HYDROCHLORIDE 2.5 MG: 5 TABLET ORAL at 20:05

## 2017-10-13 RX ADMIN — LORATADINE 10 MG: 10 TABLET ORAL at 08:57

## 2017-10-13 RX ADMIN — Medication 25 MG: at 22:11

## 2017-10-13 RX ADMIN — VITAMIN D, TAB 1000IU (100/BT) 1000 UNITS: 25 TAB at 08:57

## 2017-10-13 RX ADMIN — ACETAMINOPHEN 975 MG: 325 TABLET, FILM COATED ORAL at 00:37

## 2017-10-13 RX ADMIN — GABAPENTIN 300 MG: 100 CAPSULE ORAL at 20:05

## 2017-10-13 RX ADMIN — OXYCODONE HYDROCHLORIDE 2.5 MG: 5 TABLET ORAL at 23:47

## 2017-10-13 RX ADMIN — ACETAMINOPHEN 975 MG: 325 TABLET, FILM COATED ORAL at 08:57

## 2017-10-13 RX ADMIN — ACETAMINOPHEN 975 MG: 325 TABLET, FILM COATED ORAL at 23:46

## 2017-10-13 RX ADMIN — Medication 1 MG: at 02:19

## 2017-10-13 RX ADMIN — SENNOSIDES AND DOCUSATE SODIUM 2 TABLET: 8.6; 5 TABLET ORAL at 08:56

## 2017-10-13 RX ADMIN — GABAPENTIN 300 MG: 100 CAPSULE ORAL at 08:57

## 2017-10-13 RX ADMIN — ESCITALOPRAM OXALATE 20 MG: 20 TABLET ORAL at 09:02

## 2017-10-13 NOTE — PROGRESS NOTES
Care Coordinator Progress Note     Admission Date/Time:  10/11/2017  Attending MD:  Mike Carter*     Data  Chart reviewed, discussed with interdisciplinary team.   Patient was admitted for: History of total shoulder replacement, unspecified laterality.    Concerns with insurance coverage for discharge needs: None.  Current Living Situation: Patient lives alone.  Support System: Supportive and Involved- daughterZakia  Services Involved: no home services prior to admission  Transportation: Family or Friend will provide  Barriers to Discharge: c/o nausea and dizziness this AM, discharge deferred until Saturday per Radha Jacobs Ortho NP    Coordination of Care and Referrals: No referrals initiated        Assessment  Patient is s/p total shoulder replacement. Met with patient briefly to discuss d/c planning. Patient's daughter, Zakia, will be staying with her for the next week and available to assist 24-7. Patient has no concerns and declined need for home care.  She is experiencing nausea and dizziness this morning and Ortho team has deferred discharge until tomorrow.     Plan  Anticipated Discharge Date:  Sat 10/14  Anticipated Discharge Plan:  Home with family support    Ericka Benjamin RN  Care Coordinator  Pager [phone]

## 2017-10-13 NOTE — PLAN OF CARE
Problem: Shoulder Arthroplasty (Adult)  Goal: Signs and Symptoms of Listed Potential Problems Will be Absent, Minimized or Managed (Shoulder Arthroplasty)  Signs and symptoms of listed potential problems will be absent, minimized or managed by discharge/transition of care (reference Shoulder Arthroplasty (Adult) CPG).             VS:    Hypotensive and fluids ordered by moonlighter at 100ml/hr for 3 hrs.    Output:    Voiding adequate amts in bathroom.    Activity:    WAR. Ambulated with A1. Unsteady on feet and reported dizziness. [place] belt used.    Skin: WDL with exception of shoulder incision and small dermatology incision in left ankle. Both dressings are CDI    Pain:    Tolerable with 1mg Dilaudid Q4hrs. USE CAUTIOUSLY due to hypotension and dizziness.    CMS:    Fingers numb on right hand.    Dressing:    CDI    Diet:    Tolerating regular diet well. Appetite good.    LDA:    PIV left arm. Hemovac 20ml out.    Equipment:    None   Plan:    Continue to monitor vitals and safety while ambulating.    Additional Info:

## 2017-10-13 NOTE — PROGRESS NOTES
Pt notes fair pain control  + nausea with po dilaudid  Has taken oxycodone in the past, recalls that it made her dizzy but doesn't recall nausea  + flatus, - BM    Afebrile  Low grade T  -110/  HR 70s-80s    Alert, fully oriented  Lungs clear   CV rrr  Abd soft, non-distended  No LE edema    BMP stable  Hgb 10.5        Assessment    S/p R reverse TSA, Pt is doing well, though she continues to have nausea, related to dilaudid.  Abd exam is benign     Plan  Trial of oxycodone in place of dilaudid--Pt agrees  Mechanical dVT proph     Hx hemachromatosis, stable

## 2017-10-13 NOTE — PROGRESS NOTES
S:   Pt doing well.  Pain well controlled. Nauseated.    O:   Sets deltoid.         EPL, FPL, FA, I, G + motor.         Sensation grossly intact to light touch in Axillary,            Musculocutaneous, Median, Radial, and            Ulnar nerve distributions.         Dressing clean, dry and intact.       A:   Doing well         P:   ** D/C planning when discharge criteria met            ** Appreciate Medicine assistance with medical            comorbidities.        ** PO Analgesia today.        ** Sling at all times when not doing exercises.   ** NSAIDs are OK if needed for pain relief and not contraindicated.

## 2017-10-13 NOTE — PLAN OF CARE
Problem: Patient Care Overview  Goal: Individualization & Mutuality  Pt A/O X 4. Oral temperature 100.8, IS encouraged, scheduled Tylenol given, oral temp retake 97.2. VSS, systolic pressure in the mid 90's (Asymptomatic),  notified in person, and pt encouraged to drink fluids. Lungs- Clear bilaterally with both anterior and posterior. Bowels- Hyperactive in all four quadrants. CMS and Neuro's are intact. Denies numbness and tingling in all extremities. Denies nausea, shortness of breath, and chest pain. Has pain in the right shoulder and given 2.5mg Oxycodone, scheduled Tylenol, ICE applied, and is tolerating well. Pt declined pain medications at times when offered. Voids spontaneously without difficulty in the bathroom. Is on a Regular diet and appetite was Good this shift. Right shoulder incisional dressing is C/D/I and in a sling/immobilizer. Pt up in room with Assist x 1. PIV is patent in the left arm and saline locked. SERGIO's and PCD's on BLE's. Bilateral heels are elevated off the bed. Pt is able to make needs known and the call light is within the pt's reach. Continue to monitor.

## 2017-10-13 NOTE — CONSULTS
Addendum: [facility] would have beds available  should pt need TCU stay. Writer spoke w/Radha Jacobs NP. Per Radha, pt may improve on Saturday as her balance issues today may be caused by amount of anesthesia pt received for surgery. SW con't to follow for possible DC plan to TCU      Social Work: Assessment with Discharge Plan    Patient Name:  Autumn Matthews  :  1940  Age:  77 year old  MRN:  [id number]  Risk/Complexity Score:  Filed Complexity Screen Score: 3  Completed assessment with:  Pt, OT (Deepika)    Presenting Information   Reason for Referral:  Discharge plan  Date of Intake:  2017  Referral Source:  OT Deepika  Decision Maker:  pt  Alternate Decision Maker:  Adult daughter    Living Situation:  House  Previous Functional Status:  Independent  Patient and family understanding of hospitalization:  Seeking shoulder surgery  Cultural/Language/Spiritual Considerations:  , elderly  Adjustment to Illness:  accepting    Physical Health  Reason for Admission:    1. History of total shoulder replacement, unspecified laterality      Services Needed/Recommended:  TCU ([facility]) vs. Home w/homecare. Pt's stated preference is to DC home w/homecare    Mental Health/Chemical Dependency  Diagnosis:  Na  Support/Services in Place:  NA  Services Needed/Recommended:  NA    Support System  Significant relationship at present time:  Pt's daughter is flying in from [place] today  Family of origin is available for support:  yes  Other support available:  Friends, other family  Gaps in support system:  None noted  Patient is caregiver to:  None     Provider Information   Primary Care Physician:  Rosette Rouse   [phone]   Clinic:  [facility] [address] [facility]  / TYSHAWN *      :      Financial   Income Source:  Pension, social security  Financial Concerns:  None noted  Insurance:    Payor/Plan Subscriber Name Rel Member # Group #  "  MEDICARE - MEDICARE F* [employer]  [id number]       ATTN CLAIMS, [address]   BCBS - BCBS PLATINUM * [employer]  [id number]      [address] [address]       Discharge Plan   Patient and family discharge goal:  DC home w/homecare. Autumn did agree to referral to [facility] as \"back up\" plan  Provided education on discharge plan:  YES  Patient agreeable to discharge plan:  YES  A list of Medicare Certified Facilities was provided to the patient and/or family to encourage patient choice. Patient's choices for facility are:  [facility], [place]. \"I have heard good things and it is close to home\"  Will NH provide Skilled rehabilitation or complex medical:  YES  General information regarding anticipated insurance coverage and possible out of pocket cost was discussed. Patient and patient's family are aware patient may incur the cost of transportation to the facility, pending insurance payment: YES  Barriers to discharge:  Final PT/OT recommendations, medical stability    Discharge Recommendations   Anticipated Disposition:  TCU vs. Home w/home care  Transportation Needs:  Daughter can provide transportation  Name of Transportation Company and Phone:  Daughter can provide    Additional comments   Writer introduced role/reason for visit. Pt is agreeable to having referral to [facility], but prefers to DC home w/assist from her family.  Writer discussed potential private room fees, pt verbalized understanding. Writer did call and make referral to [facility], faxed assessment information via Epic.     Pt identifies having support and involvement of others and believes she will be okay for DC home. She states her daughter is encouraging her to think of DC to home since family will be there and pt states this is her first choice. Writer provided support, reassurance and validation. SW and RNCC to follow for DC planning  "

## 2017-10-13 NOTE — PLAN OF CARE
Problem: Patient Care Overview  Goal: Plan of Care/Patient Progress Review  Pt. A&Ox4. BP soft otherwise, VSS. Afebrile. Lung sounds CTA. O2 sats-97% on RA. IS encouraged. Bowel sounds active, LBM 10/11, flatus +. PP+ DP+. CMS and neuro's are intact. Reports numbness in R hand. Denies nausea, shortness of breath, and chest pain. RUE pain managed w/ scheduled tylenol, prn PO Dilaudid, and ice applied. Voids spontaneously without difficulty in the BR. Tolerating regular diet. RUE incisional dressing is CDI, immobilizer sling in place. Hemovac removed this am per ortho. Pt up with ax1. PIV is patent and SL. PCD's on BLE's. Bilateral heels are elevated off the bed. Call light is within reach, pt able to make needs known. Will continue to monitor.    Pt nauseated this am, pain medication switched from PO Dilaudid to Oxycodone. Given prn compazine and peppermint aromatherapy, w/ improvement.

## 2017-10-13 NOTE — PLAN OF CARE
Problem: Patient Care Overview  Goal: Plan of Care/Patient Progress Review  Discharge Planner OT   Patient plan for discharge: Home with assist vs. TCU, patient hoping to DC home with assist from daughter but understands not strong enough for DC home.  Current status: Patient limited by dizziness/nausea and overall not feeling well, still unsteady with mobility. Recommend PT eval for balance/ambulation as not improving late POD #2.  CGA for ambulation, toilet task and g/h tasks standing 5 minutes.  Unable to address sling management, dressing tasks or ambulation in hallway this morning due to not feeling well.  In the afternoon, addressed sling management and one handed dressing, following directions well but needing Mod A overall.  R hand/wrist with less movement than yesterday, sensation intact.  Removed shoulder immobilizer and placed in elbow extension with support of pillows as recommended by NP.    Barriers to return to prior living situation: Fatigue, dizziness, unsteady, and need for assist with all ADLs  Recommendations for discharge: Dual plan at this time due to slow progression with therapy  Rationale for recommendations: Continued OT 2x/day for ambulation and independence with ADLs       Entered by: Deepika Ny 10/13/2017 8:13 AM

## 2017-10-14 ENCOUNTER — APPOINTMENT (OUTPATIENT)
Dept: OCCUPATIONAL THERAPY | Facility: CLINIC | Age: 77
DRG: 483 | End: 2017-10-14
Attending: ORTHOPAEDIC SURGERY
Payer: MEDICARE

## 2017-10-14 ENCOUNTER — APPOINTMENT (OUTPATIENT)
Dept: PHYSICAL THERAPY | Facility: CLINIC | Age: 77
DRG: 483 | End: 2017-10-14
Attending: NURSE PRACTITIONER
Payer: MEDICARE

## 2017-10-14 VITALS
TEMPERATURE: 97.5 F | HEART RATE: 77 BPM | OXYGEN SATURATION: 95 % | RESPIRATION RATE: 16 BRPM | SYSTOLIC BLOOD PRESSURE: 115 MMHG | WEIGHT: 131.17 LBS | HEIGHT: 63 IN | BODY MASS INDEX: 23.24 KG/M2 | DIASTOLIC BLOOD PRESSURE: 64 MMHG

## 2017-10-14 PROCEDURE — 40000133 ZZH STATISTIC OT WARD VISIT: Performed by: OCCUPATIONAL THERAPIST

## 2017-10-14 PROCEDURE — 25000132 ZZH RX MED GY IP 250 OP 250 PS 637: Mod: GY | Performed by: INTERNAL MEDICINE

## 2017-10-14 PROCEDURE — A9270 NON-COVERED ITEM OR SERVICE: HCPCS | Mod: GY | Performed by: INTERNAL MEDICINE

## 2017-10-14 PROCEDURE — 25000132 ZZH RX MED GY IP 250 OP 250 PS 637: Mod: GY | Performed by: ORTHOPAEDIC SURGERY

## 2017-10-14 PROCEDURE — 40000193 ZZH STATISTIC PT WARD VISIT: Performed by: PHYSICAL THERAPIST

## 2017-10-14 PROCEDURE — 99231 SBSQ HOSP IP/OBS SF/LOW 25: CPT | Performed by: INTERNAL MEDICINE

## 2017-10-14 PROCEDURE — 97530 THERAPEUTIC ACTIVITIES: CPT | Mod: GP | Performed by: PHYSICAL THERAPIST

## 2017-10-14 PROCEDURE — 97535 SELF CARE MNGMENT TRAINING: CPT | Mod: GO | Performed by: OCCUPATIONAL THERAPIST

## 2017-10-14 PROCEDURE — 97110 THERAPEUTIC EXERCISES: CPT | Mod: GO | Performed by: OCCUPATIONAL THERAPIST

## 2017-10-14 PROCEDURE — 97116 GAIT TRAINING THERAPY: CPT | Mod: GP | Performed by: PHYSICAL THERAPIST

## 2017-10-14 PROCEDURE — 97161 PT EVAL LOW COMPLEX 20 MIN: CPT | Mod: GP | Performed by: PHYSICAL THERAPIST

## 2017-10-14 PROCEDURE — A9270 NON-COVERED ITEM OR SERVICE: HCPCS | Mod: GY | Performed by: ORTHOPAEDIC SURGERY

## 2017-10-14 RX ADMIN — OXYCODONE HYDROCHLORIDE 5 MG: 5 TABLET ORAL at 09:05

## 2017-10-14 RX ADMIN — VITAMIN D, TAB 1000IU (100/BT) 1000 UNITS: 25 TAB at 08:40

## 2017-10-14 RX ADMIN — SENNOSIDES AND DOCUSATE SODIUM 1 TABLET: 8.6; 5 TABLET ORAL at 08:42

## 2017-10-14 RX ADMIN — GABAPENTIN 300 MG: 100 CAPSULE ORAL at 14:14

## 2017-10-14 RX ADMIN — GABAPENTIN 300 MG: 100 CAPSULE ORAL at 08:41

## 2017-10-14 RX ADMIN — OXYCODONE HYDROCHLORIDE 2.5 MG: 5 TABLET ORAL at 06:01

## 2017-10-14 RX ADMIN — OXYCODONE HYDROCHLORIDE 2.5 MG: 5 TABLET ORAL at 02:18

## 2017-10-14 RX ADMIN — OXYCODONE HYDROCHLORIDE 5 MG: 5 TABLET ORAL at 12:04

## 2017-10-14 RX ADMIN — LORATADINE 10 MG: 10 TABLET ORAL at 08:41

## 2017-10-14 RX ADMIN — ESCITALOPRAM OXALATE 20 MG: 20 TABLET ORAL at 08:41

## 2017-10-14 RX ADMIN — ACETAMINOPHEN 975 MG: 325 TABLET, FILM COATED ORAL at 08:39

## 2017-10-14 NOTE — PLAN OF CARE
Problem: Patient Care Overview  Goal: Plan of Care/Patient Progress Review  Discharge Planner PT   Patient plan for discharge: Home with assist from daughter  Current status: Pt tx supine>sit at EOB and STS with SBA. Pt amb hallway 150 ft x 2 with SPC and CGA-SBA. Pt negotiated 3 stairs x 2 reps with CGA and SPC vs HHA at E for stability. Daughter present throughout and practiced safe guarding techniques for home.   Barriers to return to prior living situation: With Ax1 for CGA-SBA, pt is safe for disch and mobility within home.  Recommendations for discharge: Home with SPC and 24/7 assist available, HHPT to address balance and endurance impairments.  Rationale for recommendations: Progress stability and strength/endurance for stairs and community amb.       Entered by: Jeannie Veliz 10/14/2017 12:03 PM

## 2017-10-14 NOTE — PLAN OF CARE
Problem: Shoulder Arthroplasty (Adult)  Goal: Signs and Symptoms of Listed Potential Problems Will be Absent, Minimized or Managed (Shoulder Arthroplasty)  Signs and symptoms of listed potential problems will be absent, minimized or managed by discharge/transition of care (reference Shoulder Arthroplasty (Adult) CPG).   Outcome: Improving            VS:    BP low, trending stable   Output:    Voids spontaneously and without difficulty   Activity:    Up with standby assist. Ambulated in room and to bathroom. PT OT appointments   Skin: WDL - incision pt declined full inspection.   Pain:    Managed with PRN oxycodone - pt calls   CMS:    Intact - denies numbness and tingling   Dressing:    CDI   Diet:    Regular, tolerates well appetite good   LDA:    Perihperal IV left lower forearm   Equipment:    Shoulder immoblizer   Plan:    Anticipate discharge today, pending therapy evaluation   Additional Info:

## 2017-10-14 NOTE — PROGRESS NOTES
[facility] Orthopedic Progress Note       S:  No issues overnight. Pain controlled. Denies numbness, tingling. Tolerating PO. Voiding. Denies numbness, tingling, CP, SOB, dizziness.     O: Patient Vitals for the past 24 hrs:   BP Temp Temp src Heart Rate Resp SpO2   10/13/17 2259 106/55 98.6  F (37  C) Oral 83 16 95 %   10/13/17 1602 112/55 97.9  F (36.6  C) Oral 74 16 93 %   10/13/17 1436 97/49 97.2  F (36.2  C) Oral 77 16 93 %   10/13/17 1220 94/51 100.2  F (37.9  C) Oral 74 15 94 %   10/13/17 1048 - 99.1  F (37.3  C) Oral - - -   10/13/17 1015 - 100.1  F (37.8  C) Oral - - -   10/13/17 0828 111/62 100.8  F (38.2  C) Oral 78 16 92 %   10/13/17 0810 100/60 - - - - -     No intake or output data in the 24 hours ending 10/14/17 0642    Gen: A&Ox3, no acute distress  Resp: breathing equal and non-labored, no wheezing  Extremities: LUE dressing C.D.I. Sets delt, Fires EPL, FPL, Intrinsics, biceps. SILT in R/M/U/Ax/MSC nerves. Radial pulse palp.     Labs:  Hemoglobin   Date Value Ref Range Status   10/13/2017 10.5 (L) 11.7 - 15.7 g/dL Final   10/12/2017 12.4 11.7 - 15.7 g/dL Final         A/P: Autumn Matthews is a 77 year old female s/p left reverse TSA on 10/11 with Dr. Carter. No issues.     -NWB LUE  -Activity: Sling at all times except for therapy  -DVT ppx: ambulation  -Abx: completed  -PT  -Pain: oral meds   -Diet: regular  -Dispo: Likely Home today.     Jordin Mclean  PGY4  Pager [phone]

## 2017-10-14 NOTE — PROGRESS NOTES
Pt. discharged at 3:01pm via [facility] and wheel chair to home. Pt. was accompanied by daughter, and left with personal belongings. Prior to discharge, PIV was removed. Pt. received complete discharge paperwork and PRN medications medications as filled by discharge pharmacy. Pt. was given times of last dose for all discharge medications in writing on discharge medication sheets.  Discharge teaching included infection prevention, bowel regiment medication, pain management, activity restrictions, dressing changes, and signs and symptoms of infection. Pt. to follow up with Dr. Carter on 10/30/17 at 12:50pm. Pt. had no further questions at the time of discharge and no unmet needs were identified.

## 2017-10-14 NOTE — PROGRESS NOTES
Pt feels better  Pain is well controlled with oxycodone  Mild dizziness, but overall is improved    No chest pain or SOB  + BM  Voiding without difficulty    VSS  Afebrile    Alert, fully oriented  Lungs clear  CV rrr  Abd soft  No LE edema    Assessment    S/p R reverse TSA, Pt is doing well. Pt is tolerating oxycodone    Plan  D/c to home today or tomorrow

## 2017-10-14 NOTE — PLAN OF CARE
Problem: Patient Care Overview  Goal: Plan of Care/Patient Progress Review  Pt A/O X 4. VSS. CMS and Neuro's are intact. Denies numbness and tingling in all extremities. Denies nausea, shortness of breath, and chest pain. Pt denies lightheadedness when walking. Has pain in the right shoulder and given oxycodone, ICE applied, and is tolerating well. Dressing is CDI. Pt is able to make needs known and the call light is within the pt's reach. Continue to monitor.

## 2017-10-14 NOTE — PROGRESS NOTES
10/14/17 1219   Quick Adds   Type of Visit Initial PT Evaluation   Living Environment   Lives With alone   Living Arrangements house   Number of Stairs to Enter Home 2   Number of Stairs Within Home 0   Transportation Available car;family or friend will provide   Living Environment Comment Pt does not need to access lower level of home. Per daughter, walk-in shower does have a small lip to step over.    Self-Care   Dominant Hand right   Usual Activity Tolerance moderate   Current Activity Tolerance moderate   Equipment Currently Used at Home none   Activity/Exercise/Self-Care Comment Pt was IND with all mobility and ADLs at baseline. Pt reports participating in water aerobics.   Functional Level Prior   Ambulation 0-->independent   Transferring 0-->independent   Toileting 0-->independent   Bathing 0-->independent   Dressing 0-->independent   Eating 0-->independent   Communication 0-->understands/communicates without difficulty   Swallowing 0-->swallows foods/liquids without difficulty   Cognition 0 - no cognition issues reported   Fall history within last six months yes   Number of times patient has fallen within last six months 1   Which of the above functional risks had a recent onset or change? ambulation;transferring   Prior Functional Level Comment Pt was IND with all mobility; notes increased difficulty dressing pre-operatively.   General Information   Onset of Illness/Injury or Date of Surgery - Date 10/13/17   Referring Physician Radha Jacobs APRN CNP   Patient/Family Goals Statement Regain IND    Pertinent History of Current Problem (include personal factors and/or comorbidities that impact the POC) Per chart review, pt is POD 3 s/p left reverse TSA.   Precautions/Limitations fall precautions   Weight-Bearing Status - LUE full weight-bearing   Weight-Bearing Status - RUE nonweight-bearing   Weight-Bearing Status - LLE full weight-bearing   Weight-Bearing Status - RLE full weight-bearing   Heart  Disease Risk Factors Lack of physical activity;Stress;Age;Gender   General Observations Pt resting comfortably in bed, brace donned to RUE and daughter present.    Cognitive Status Examination   Orientation orientation to person, place and time   Level of Consciousness alert   Follows Commands and Answers Questions 100% of the time   Personal Safety and Judgment intact   Memory intact   Pain Assessment   Patient Currently in Pain No   Integumentary/Edema   Integumentary/Edema no deficits were identifed   Posture    Posture Not impaired   Range of Motion (ROM)   ROM Comment R shoulder ROM restrictions post-operatively; Otherwise grossly WFL per functional mobility assessment   Strength   Strength Comments No formal assessment, mild deconditioning noted with need for UE support to help tx sit>stand, challenge with stairs   Bed Mobility   Bed Mobility Comments Pt completed supine>sit with SBA, from flat bed, with shoulder sling donned.   Transfer Skills   Transfer Comments Pt tx sit<>Stand with SBA and 1 UE pushing off from support.   Gait   Gait Comments Pt amb within room with LUE support at therapist, min VCs to correct weight shift R>midline for improved stability. Requires occasional VCs to clear R sided obstacles so to not bump sling.   Balance   Balance Comments Good sitting balance. Fair dynamic standing balance, requires LUE support and CGA-close SBA for amb.   General Therapy Interventions   Planned Therapy Interventions balance training;gait training;strengthening;transfer training;home program guidelines;risk factor education;progressive activity/exercise   Clinical Impression   Criteria for Skilled Therapeutic Intervention yes, treatment indicated   PT Diagnosis Impaired functional mobility   Influenced by the following impairments Decreased strength, endurance, stability, safety awareness   Functional limitations due to impairments IND amb and stair negotiation.   Clinical Presentation  "Stable/Uncomplicated   Clinical Presentation Rationale Pt progressing fairly with mobility, able to complete all transfers and gait with SBA-CGA.    Clinical Decision Making (Complexity) Low complexity   Therapy Frequency` daily   Predicted Duration of Therapy Intervention (days/wks) 1 day   Anticipated Equipment Needs at Discharge standard cane   Anticipated Discharge Disposition Home with Assist;Home with Home Therapy   Risk & Benefits of therapy have been explained Yes   Patient, Family & other staff in agreement with plan of care Yes   [facility] TM \"6 Clicks\"   2016, Trustees of [facility], under license to [employer].  All rights reserved.   6 Clicks Short Forms Basic Mobility Inpatient Short Form   [facility] AM-PAC  \"6 Clicks\" V.2 Basic Mobility Inpatient Short Form   1. Turning from your back to your side while in a flat bed without using bedrails? 4 - None   2. Moving from lying on your back to sitting on the side of a flat bed without using bedrails? 4 - None   3. Moving to and from a bed to a chair (including a wheelchair)? 4 - None   4. Standing up from a chair using your arms (e.g., wheelchair, or bedside chair)? 3 - A Little   5. To walk in hospital room? 3 - A Little   6. Climbing 3-5 steps with a railing? 3 - A Little   Basic Mobility Raw Score (Score out of 24.Lower scores equate to lower levels of function) 21   Total Evaluation Time   Total Evaluation Time (Minutes) 7     "

## 2017-10-14 NOTE — PLAN OF CARE
Problem: Patient Care Overview  Goal: Plan of Care/Patient Progress Review  OT: Patient is meeting OT goals for DC home with full-time assist from daughter x 1 week. Recommend home PT and home OT to maximize safety, (I) and to complete elbow/wrist/hand HEP. Planning to DC home this afternoon.    Comments:   Occupational Therapy Discharge Summary     Reason for therapy discharge:    Discharged to home with home therapy.  All goals and outcomes met, no further needs identified.     Progress towards therapy goal(s). See goals on Care Plan in [facility] electronic health record for goal details.  Goals met     Therapy recommendation(s):    Continued therapy is recommended.  Rationale/Recommendations:  Home OT for bathing safety, shoulder precautions.

## 2017-10-14 NOTE — PROGRESS NOTES
Discharge Planning Update-Care Coordination    D:  Received update from the interdisciplinary team after receiving a page stating that patient will benefit from home PT per recommendation of the inpatient Rehabilitation Consult Team.  After talking with patient via phone and verifying her local address, this writer arranged home PT with [facility] and imbedded the order in patient's discharge orders for MD team to sign.  Medicare also required a Fact to face document to justify home bound status and this was also completed for MD team member to review and sign the day of discharge.  No other request for discharge interventions at this time.  A/P:  Inpatient cares continue per MD team plans of care.  Inpatient Care Management Team will be available to assist with updated transition needs prn.  On day of discharge, patient will follow up as designated in discharge orders.    Olga Tse, B.S.N., P.H.N.,R.N.         Pager

## 2017-10-30 ENCOUNTER — OFFICE VISIT (OUTPATIENT)
Dept: ORTHOPEDICS | Facility: CLINIC | Age: 77
End: 2017-10-30

## 2017-10-30 VITALS — BODY MASS INDEX: 23.21 KG/M2 | WEIGHT: 131 LBS | HEIGHT: 63 IN

## 2017-10-30 DIAGNOSIS — Z96.611 HISTORY OF TOTAL REPLACEMENT OF RIGHT SHOULDER JOINT: Primary | ICD-10-CM

## 2017-10-30 NOTE — PROGRESS NOTES
S:  Doing well. Off pain meds. Seen with daughter.    O:  Incision clean, dry, and intact  Sets Deltoid  Wiggles fingers  Warm and well-perfused hand with palpable pulse    A/P:  Doing well  Advance per op report  Dicussed driving and going to [place].  If she plans to go to [place] on 1/1/18, we will get radiographs at the next visit.

## 2017-10-30 NOTE — LETTER
10/30/2017       RE: Autumn Matthews  [address]  [place] [facility] [place] [address]     Dear Colleague,    Thank you for referring your patient, Autumn Matthews, to the [facility] ORTHOPAEDIC CLINIC at [facility]. Please see a copy of my visit note below.    S:  Doing well. Off pain meds. Seen with daughter.    O:  Incision clean, dry, and intact  Sets Deltoid  Wiggles fingers  Warm and well-perfused hand with palpable pulse    A/P:  Doing well  Advance per op report  Dicussed driving and going to [place].  If she plans to go to [place] on 1/1/18, we will get radiographs at the next visit.      Again, thank you for allowing me to participate in the care of your patient.      Sincerely,    Mike Carter MD

## 2017-10-30 NOTE — NURSING NOTE
"Reason For Visit:   Chief Complaint   Patient presents with     Surgical Followup     DOS 10/11/17 S/P Right reverse total shoulder arthroplasty     PCP: No primary care provider on file.  Ref: self      ?  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  Date of injury: 2016  Type of injury: Carrying a heavy box downstairs. Had a fall later in the year.  Date of surgery: 10/11/17  Type of surgery: Right reverse total shoulder arthroplasty  Smoker: No          Right hand dominant      SANE score  Affected shoulder: Right   Right shoulder SANE: 5  Left shoulder SANE: 75    Ht 1.6 m (5' 3\")  Wt 59.4 kg (131 lb)  BMI 23.21 kg/m2      Pain Assessment  Patient Currently in Pain: Yes  0-10 Pain Scale: 4  Primary Pain Location: Shoulder  Pain Orientation: Right  Pain Descriptors: Aching, Constant  Alleviating Factors: Rest  Aggravating Factors: Movement    Cielo Ceja LPN      "

## 2017-10-30 NOTE — MR AVS SNAPSHOT
After Visit Summary   10/30/2017    Autumn Matthews    MRN: [id number]           Patient Information     Date Of Birth          1940        Visit Information        Provider Department      10/30/2017 12:50 PM Mike Carter MD [facility]         Follow-ups after your visit        Your next 10 appointments already scheduled     2017 10:50 AM CST   (Arrive by 10:35 AM)   RETURN SHOULDER with Mike Carter MD   [facility] ([facility])    [address] [place] [address] [phone]              Who to contact     Please call your clinic at [phone] to:    Ask questions about your health    Make or cancel appointments    Discuss your medicines    Learn about your test results    Speak to your doctor   If you have compliments or concerns about an experience at your clinic, or if you wish to file a complaint, please contact [facility] Patient Relations at [phone] or email us at [email or web address]         Additional Information About Your Visit        MyChart Information     [employer] is an electronic gateway that provides easy, online access to your medical records. With [employer], you can request a clinic appointment, read your test results, renew a prescription or communicate with your care team.     To sign up for [employer] visit the website at [email or web address]   You will be asked to enter the access code listed below, as well as some personal information. Please follow the directions to create your username and password.     Your access code is: [email or web address]  Expires: 2017  6:31 AM     Your access code will  in 90 days. If you need help or a new code, please contact your [facility] or call [phone] for assistance.        Care EveryWhere ID     This is your Care EveryWhere ID. This  "could be used by other organizations to access your [place] medical records  [id number]        Your Vitals Were     Height BMI (Body Mass Index)                1.6 m (5' 3\") 23.21 kg/m2           Blood Pressure from Last 3 Encounters:   10/14/17 115/64    Weight from Last 3 Encounters:   10/30/17 59.4 kg (131 lb)   10/11/17 59.5 kg (131 lb 2.8 oz)   09/18/17 64 kg (141 lb)              Today, you had the following     No orders found for display       Primary Care Provider Office Phone # Fax #    Rosette Rouse [phone]       [facility] [address] [place] [address]        Equal Access to Services     [facility] : Hadii filiberto benz hadasho Soomaali, waaxda luqadaha, qaybta kaalmada adeegyada, waxay kemarin haysreedhar jensen . So [facility] [phone].    ATENCIÓN: Si habla español, tiene a pinzon disposición servicios gratuitos de asistencia lingüística. Ll[facility]den[facility] [phone].    We comply with applicable federal civil rights laws and [place] laws. We do not discriminate on the basis of race, color, national origin, age, disability, sex, sexual orientation, or gender identity.            Thank you!     Thank you for choosing [facility] ORTHOPAEDIC [facility]  for your care. Our goal is always to provide you with excellent care. Hearing back from our patients is one way we can continue to improve our services. Please take a few minutes to complete the written survey that you may receive in the mail after your visit with us. Thank you!             Your Updated Medication List - Protect others around you: Learn how to safely use, store and throw away your medicines at www.disposemymeds.org.          This list is accurate as of: 10/30/17  1:21 PM.  Always use your most recent med list.                   Brand Name Dispense Instructions for use Diagnosis    acetaminophen 325 MG tablet    TYLENOL    100 tablet    Take 2 tablets (650 mg) by mouth every 4 hours as needed for " other (surgical pain)    History of total shoulder replacement, unspecified laterality       ADVIL 200 MG tablet   Generic drug:  ibuprofen           cholecalciferol 1000 UNIT tablet    vitamin D3     daily (every 24 hours).        escitalopram 20 MG tablet    LEXAPRO     Take 20 mg by mouth        ESTRADIOL 0.025/ESTRIOL 0.1 MG/GM CREAM      Estradiol vaginal cream 20mcg/gm.  Apply 0.5gm into vagina two times per week.        fish oil-omega-3 fatty acids 1000 MG capsule      daily (every 24 hours).        GABAPENTIN PO      Take 300 mg by mouth 3 times daily        loratadine 10 MG tablet    CLARITIN     Take 10 mg by mouth        MAGNESIUM PO           MULTIPLE VITAMINS-MINERALS PO           oxyCODONE 5 MG IR tablet    ROXICODONE    50 tablet    Take 0.5-1 tablets (2.5-5 mg) by mouth every 4 hours as needed for moderate to severe pain    History of total shoulder replacement, unspecified laterality       senna-docusate 8.6-50 MG per tablet    SENOKOT-S;PERICOLACE    50 tablet    Take 1-2 tablets by mouth 2 times daily as needed for constipation    History of total shoulder replacement, unspecified laterality       traZODone 50 MG tablet    DESYREL     Take 25-50 mg by mouth        zolpidem 5 MG tablet    AMBIEN     Take 5 mg by mouth

## 2017-11-21 DIAGNOSIS — Z96.611 S/P REVERSE TOTAL SHOULDER ARTHROPLASTY, RIGHT: Primary | ICD-10-CM

## 2017-11-27 ENCOUNTER — OFFICE VISIT (OUTPATIENT)
Dept: ORTHOPEDICS | Facility: CLINIC | Age: 77
End: 2017-11-27

## 2017-11-27 VITALS — HEIGHT: 63 IN | WEIGHT: 139.1 LBS | BODY MASS INDEX: 24.64 KG/M2

## 2017-11-27 DIAGNOSIS — M12.811 ROTATOR CUFF TEAR ARTHROPATHY OF RIGHT SHOULDER: Primary | ICD-10-CM

## 2017-11-27 DIAGNOSIS — M75.101 ROTATOR CUFF TEAR ARTHROPATHY OF RIGHT SHOULDER: Primary | ICD-10-CM

## 2017-11-27 NOTE — NURSING NOTE
"Reason For Visit:   Chief Complaint   Patient presents with     Surgical Followup     S/P Right Reverse Total Shoulder Arthroplasty. DOS: 10/11/2017.       PCP: No primary care provider on file.  Ref: self      ?  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  Date of injury: 2016  Type of injury: Carrying a heavy box downstairs. Had a fall later in the year.  Date of surgery: 10/11/17  Type of surgery: Right reverse total shoulder arthroplasty  Smoker: No          Right hand dominant    SANE score  Affected shoulder: Right  Right shoulder SANE: 75  Left shoulder SANE: 20    Ht 1.6 m (5' 3\")  Wt 63.1 kg (139 lb 1.6 oz)  BMI 24.64 kg/m2      Pain Assessment  Patient Currently in Pain: Yes  0-10 Pain Scale: 2  Primary Pain Location: Shoulder  Pain Orientation: Right  Pain Descriptors: Discomfort, Aching  Alleviating Factors: Rest  Aggravating Factors: Movement    Francesca Loving MA        "

## 2017-11-27 NOTE — MR AVS SNAPSHOT
After Visit Summary   2017    Autumn Matthews    MRN: [id number]           Patient Information     Date Of Birth          1940        Visit Information        Provider Department      2017 10:50 AM Mike Carter MD [facility]        Today's Diagnoses     Rotator cuff tear arthropathy of right shoulder    -  1       Follow-ups after your visit        Additional Services     [facility] PT, HAND, AND CHIROPRACTIC REFERRAL       Cuff, Deltoid and parascapular strengthening and range of motion.    Teach and supervise home program.    Progress as tolerated to strengthening.                  Who to contact     Please call your clinic at [phone] to:    Ask questions about your health    Make or cancel appointments    Discuss your medicines    Learn about your test results    Speak to your doctor   If you have compliments or concerns about an experience at your clinic, or if you wish to file a complaint, please contact [facility] Patient Relations at [phone] or email us at [email or web address]         Additional Information About Your Visit        [employer] Information     [employer] is an electronic gateway that provides easy, online access to your medical records. With [employer], you can request a clinic appointment, read your test results, renew a prescription or communicate with your care team.     To sign up for [employer] visit the website at [email or web address]   You will be asked to enter the access code listed below, as well as some personal information. Please follow the directions to create your username and password.     Your access code is: [email or web address]  Expires: 2018  6:31 AM     Your access code will  in 90 days. If you need help or a new code, please contact your [facility] or call [phone] for assistance.        Care EveryWhere ID     This is your Care EveryWhere  "ID. This could be used by other organizations to access your [place] medical records  [id number]        Your Vitals Were     Height BMI (Body Mass Index)                1.6 m (5' 3\") 24.64 kg/m2           Blood Pressure from Last 3 Encounters:   10/14/17 115/64    Weight from Last 3 Encounters:   11/27/17 63.1 kg (139 lb 1.6 oz)   10/30/17 59.4 kg (131 lb)   10/11/17 59.5 kg (131 lb 2.8 oz)              We Performed the Following     [facility] PT, HAND, AND CHIROPRACTIC REFERRAL        Primary Care Provider Office Phone # Fax #    Rosette Rouse [phone]       [facility] [address] [place] [address]        Equal Access to Services     [facility]: Hadii filiberto benz hadasho Soomaali, waaxda luqadaha, qaybta kaalmada adeegyada, azar ejnsen . So [facility] [phone].    ATENCIÓN: Si habla español, tiene a pinzon disposición servicios gratuitos de asistencia lingüística. Vasu al [phone].    We comply with applicable federal civil rights laws and [place] laws. We do not discriminate on the basis of race, color, national origin, age, disability, sex, sexual orientation, or gender identity.            Thank you!     Thank you for choosing [facility] ORTHOPAEDIC [facility]  for your care. Our goal is always to provide you with excellent care. Hearing back from our patients is one way we can continue to improve our services. Please take a few minutes to complete the written survey that you may receive in the mail after your visit with us. Thank you!             Your Updated Medication List - Protect others around you: Learn how to safely use, store and throw away your medicines at www.disposemymeds.org.          This list is accurate as of: 11/27/17 11:20 AM.  Always use your most recent med list.                   Brand Name Dispense Instructions for use Diagnosis    acetaminophen 325 MG tablet    TYLENOL    100 tablet    Take 2 tablets (650 mg) by mouth " every 4 hours as needed for other (surgical pain)    History of total shoulder replacement, unspecified laterality       ADVIL 200 MG tablet   Generic drug:  ibuprofen           cholecalciferol 1000 UNIT tablet    vitamin D3     daily (every 24 hours).        escitalopram 20 MG tablet    LEXAPRO     Take 20 mg by mouth        ESTRADIOL 0.025/ESTRIOL 0.1 MG/GM CREAM      Estradiol vaginal cream 20mcg/gm.  Apply 0.5gm into vagina two times per week.        fish oil-omega-3 fatty acids 1000 MG capsule      daily (every 24 hours).        GABAPENTIN PO      Take 300 mg by mouth 3 times daily        loratadine 10 MG tablet    CLARITIN     Take 10 mg by mouth        MAGNESIUM PO           MULTIPLE VITAMINS-MINERALS PO           traZODone 50 MG tablet    DESYREL     Take 25-50 mg by mouth        zolpidem 5 MG tablet    AMBIEN     Take 5 mg by mouth

## 2017-11-27 NOTE — LETTER
11/27/2017       RE: Autumn Matthews  [address]  [place] [address]     Dear Colleague,    Thank you for referring your patient, Autumn Matthews, to the [facility] ORTHOPAEDIC CLINIC at [facility]. Please see a copy of my visit note below.    S:  Doing well. Pain OK.    O:  Incision clean, dry, and intact  Sets Deltoid  Wiggles fingers  Warm and well-perfused hand with palpable pulse  60/10 AROM    XR: Reverse in good position.    A/P:  Doing well  Advance per op report  Will send with PT script for use in [place] after she goes.  Will see back in October with repeat XR.    Again, thank you for allowing me to participate in the care of your patient.      Sincerely,    Mike Carter MD

## 2017-11-27 NOTE — PROGRESS NOTES
S:  Doing well. Pain OK.    O:  Incision clean, dry, and intact  Sets Deltoid  Wiggles fingers  Warm and well-perfused hand with palpable pulse  60/10 AROM    XR: Reverse in good position.    A/P:  Doing well  Advance per op report  Will send with PT script for use in [place] after she goes.  Will see back in October with repeat XR.

## 2017-12-18 ENCOUNTER — MEDICAL CORRESPONDENCE (OUTPATIENT)
Dept: HEALTH INFORMATION MANAGEMENT | Facility: CLINIC | Age: 77
End: 2017-12-18

## 2018-01-24 ENCOUNTER — TRANSFERRED RECORDS (OUTPATIENT)
Dept: HEALTH INFORMATION MANAGEMENT | Facility: CLINIC | Age: 78
End: 2018-01-24

## 2018-03-05 ENCOUNTER — TRANSFERRED RECORDS (OUTPATIENT)
Dept: HEALTH INFORMATION MANAGEMENT | Facility: CLINIC | Age: 78
End: 2018-03-05

## 2018-04-03 ENCOUNTER — TELEPHONE (OUTPATIENT)
Dept: ORTHOPEDICS | Facility: CLINIC | Age: 78
End: 2018-04-03

## 2018-04-03 DIAGNOSIS — M75.101 ROTATOR CUFF TEAR ARTHROPATHY OF RIGHT SHOULDER: Primary | ICD-10-CM

## 2018-04-03 DIAGNOSIS — M12.811 ROTATOR CUFF TEAR ARTHROPATHY OF RIGHT SHOULDER: Primary | ICD-10-CM

## 2018-04-03 NOTE — TELEPHONE ENCOUNTER
[facility] Call Center    Phone Message    May a detailed message be left on voicemail: yes    Reason for Call: Order(s): Other: Additional Physcial Therapy   Reason for requested: Shoulder  Date needed: 04.02.2018- future (session completed already for yesterday)  Provider name: Dr Carter - Referral to be sent to [facility] Fax [phone]  Attn Dr. Joselo Hernández      Action Taken: Message routed to:  [facility] ortho     04/03/2018.  Order written and faxed as requested.

## 2018-04-16 ENCOUNTER — TRANSFERRED RECORDS (OUTPATIENT)
Dept: HEALTH INFORMATION MANAGEMENT | Facility: CLINIC | Age: 78
End: 2018-04-16

## 2018-04-25 ENCOUNTER — PRE VISIT (OUTPATIENT)
Dept: ORTHOPEDICS | Facility: CLINIC | Age: 78
End: 2018-04-25

## 2018-04-25 NOTE — TELEPHONE ENCOUNTER
Patient is s/p Right Reverse Total Shoulder Arthroplasty on 10/11/17.    Patient was last seen on 11/27/17 by Dr. Carter.    Patient is coming to clinic for 6 month post-op visit.      Per the last clinic note, plan is for f/u in October with new imaging.  No additional orders are needed this visit.     Patient visit type and questionnaires have been reviewed and are correct for this appointment? Yes    Daniel YANEZ, CMA

## 2018-05-07 ENCOUNTER — OFFICE VISIT (OUTPATIENT)
Dept: ORTHOPEDICS | Facility: CLINIC | Age: 78
End: 2018-05-07
Payer: COMMERCIAL

## 2018-05-07 VITALS — WEIGHT: 139 LBS | BODY MASS INDEX: 24.63 KG/M2 | HEIGHT: 63 IN

## 2018-05-07 DIAGNOSIS — Z96.611 STATUS POST REVERSE TOTAL REPLACEMENT OF RIGHT SHOULDER: Primary | ICD-10-CM

## 2018-05-07 DIAGNOSIS — Z96.611 STATUS POST REPLACEMENT OF RIGHT SHOULDER JOINT: Primary | ICD-10-CM

## 2018-05-07 RX ORDER — AMOXICILLIN 500 MG/1
TABLET, FILM COATED ORAL
Qty: 4 TABLET | Refills: 3 | Status: SHIPPED | OUTPATIENT
Start: 2018-05-07

## 2018-05-07 NOTE — PROGRESS NOTES
S:  Doing well. Having some pain with IR side and combing her hair. Now able to get her left earring in.    O:  Incision clean, dry, and intact  Sets Deltoid  Wiggles fingers  Warm and well-perfused hand with palpable pulse  95->125 H/30      A/P:  Doing well  Will refer to PT for stengthening and home program.  F/U 10/18 with XR.

## 2018-05-07 NOTE — LETTER
5/7/2018       RE: Autumn Matthews  [address]  [place] [address]     Dear Colleague,    Thank you for referring your patient, Autumn Matthews, to the [facility] ORTHOPAEDIC CLINIC at [facility]. Please see a copy of my visit note below.    S:  Doing well. Having some pain with IR side and combing her hair. Now able to get her left earring in.    O:  Incision clean, dry, and intact  Sets Deltoid  Wiggles fingers  Warm and well-perfused hand with palpable pulse  95->125 H/30      A/P:  Doing well  Will refer to PT for stengthening and home program.  F/U 10/18 with XR.    Again, thank you for allowing me to participate in the care of your patient.      Sincerely,    Mike Carter MD

## 2018-05-07 NOTE — NURSING NOTE
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  Date of injury: 2016  Type of injury: Carrying a heavy box downstairs. Had a fall later in the year.  Date of surgery: 10/11/17  Type of surgery: Right reverse total shoulder arthroplasty  Smoker: No            SANE score  Affected shoulder: Right  Right shoulder SANE: 50  Left shoulder SANE: 75    Ht 1.6 m (5' 3\")  Wt 63 kg (139 lb)  BMI 24.62 kg/m2      Pain Assessment  Patient Currently in Pain: Yes  0-10 Pain Scale: 3  Primary Pain Location: Shoulder  Pain Orientation: Right  Pain Descriptors: Aching  Alleviating Factors: Rest, Ice, Other (comment) (PT - done now)  Aggravating Factors: Other (comment), Reaching, Stretching (lifting)    Daniel YANEZ, JUNIOR        "

## 2018-05-07 NOTE — MR AVS SNAPSHOT
"              After Visit Summary   2018    Autumn Matthews    MRN: [id number]           Patient Information     Date Of Birth          1940        Visit Information        Provider Department      2018 3:10 PM Mike Carter MD [facility]        Today's Diagnoses     Status post reverse total replacement of right shoulder    -  1       Follow-ups after your visit        Additional Services     [facility] PT, HAND, AND CHIROPRACTIC REFERRAL       Cuff, Deltoid and parascapular strengthening and range of motion.    Teach and supervise home program.    Progress as tolerated to strengthening.                  Who to contact     Please call your clinic at [phone] to:    Ask questions about your health    Make or cancel appointments    Discuss your medicines    Learn about your test results    Speak to your doctor            Additional Information About Your Visit        MyChart Information     [employer] is an electronic gateway that provides easy, online access to your medical records. With [employer], you can request a clinic appointment, read your test results, renew a prescription or communicate with your care team.     To sign up for [employer] visit the website at [email or web address]   You will be asked to enter the access code listed below, as well as some personal information. Please follow the directions to create your username and password.     Your access code is: [email or web address]  Expires: 2018  2:48 PM     Your access code will  in 90 days. If you need help or a new code, please contact your [facility] or call [phone] for assistance.        Care EveryWhere ID     This is your Care EveryWhere ID. This could be used by other organizations to access your [place] medical records  [id number]        Your Vitals Were     Height BMI (Body Mass Index)                1.6 m (5' 3\") 24.62 kg/m2           Blood Pressure from Last 3 " Encounters:   10/14/17 115/64    Weight from Last 3 Encounters:   05/07/18 63 kg (139 lb)   11/27/17 63.1 kg (139 lb 1.6 oz)   10/30/17 59.4 kg (131 lb)              We Performed the Following     [facility] PT, HAND, AND CHIROPRACTIC REFERRAL        Primary Care Provider Office Phone # Fax #    Rosette Rouse [phone]       [facility] [address] [place] [address]        Equal Access to Services     [facility]: Hadii aad ku hadasho Soomaali, waaxda luqadaha, qaybta kaalmada adeegyada, waxay kemarin haysreedhar jensen . So [facility] [phone].    ATENCIÓN: Si habla español, tiene a pinzon disposición servicios gratuitos de asistencia lingüística. Ll[facility] [phone].    We comply with applicable federal civil rights laws and [place] laws. We do not discriminate on the basis of race, color, national origin, age, disability, sex, sexual orientation, or gender identity.            Thank you!     Thank you for choosing [facility] ORTHOPAED[facility]  for your care. Our goal is always to provide you with excellent care. Hearing back from our patients is one way we can continue to improve our services. Please take a few minutes to complete the written survey that you may receive in the mail after your visit with us. Thank you!             Your Updated Medication List - Protect others around you: Learn how to safely use, store and throw away your medicines at www.disposemymeds.org.          This list is accurate as of 5/7/18  3:30 PM.  Always use your most recent med list.                   Brand Name Dispense Instructions for use Diagnosis    acetaminophen 325 MG tablet    TYLENOL    100 tablet    Take 2 tablets (650 mg) by mouth every 4 hours as needed for other (surgical pain)    History of total shoulder replacement, unspecified laterality       ADVIL 200 MG tablet   Generic drug:  ibuprofen           cholecalciferol 1000 UNIT tablet    vitamin D3     daily (every 24  hours).        escitalopram 20 MG tablet    LEXAPRO     Take 20 mg by mouth        ESTRADIOL 0.025/ESTRIOL 0.1 MG/GM CREAM      Estradiol vaginal cream 20mcg/gm.  Apply 0.5gm into vagina two times per week.        fish oil-omega-3 fatty acids 1000 MG capsule      daily (every 24 hours).        GABAPENTIN PO      Take 300 mg by mouth 3 times daily        loratadine 10 MG tablet    CLARITIN     Take 10 mg by mouth        MAGNESIUM PO           MULTIPLE VITAMINS-MINERALS PO           traZODone 50 MG tablet    DESYREL     Take 25-50 mg by mouth        zolpidem 5 MG tablet    AMBIEN     Take 5 mg by mouth

## 2018-05-15 ENCOUNTER — MEDICAL CORRESPONDENCE (OUTPATIENT)
Dept: HEALTH INFORMATION MANAGEMENT | Facility: CLINIC | Age: 78
End: 2018-05-15

## 2018-06-18 ENCOUNTER — HOSPITAL PATHOLOGY (OUTPATIENT)
Dept: OTHER | Facility: CLINIC | Age: 78
End: 2018-06-18

## 2018-06-19 LAB — COPATH REPORT: NORMAL

## 2018-10-11 DIAGNOSIS — Z09 FOLLOW-UP EXAMINATION FOLLOWING SURGERY: Primary | ICD-10-CM

## 2018-10-15 ENCOUNTER — RADIANT APPOINTMENT (OUTPATIENT)
Dept: GENERAL RADIOLOGY | Facility: CLINIC | Age: 78
End: 2018-10-15
Attending: ORTHOPAEDIC SURGERY
Payer: COMMERCIAL

## 2018-10-15 ENCOUNTER — OFFICE VISIT (OUTPATIENT)
Dept: ORTHOPEDICS | Facility: CLINIC | Age: 78
End: 2018-10-15
Payer: COMMERCIAL

## 2018-10-15 VITALS — HEIGHT: 63 IN | BODY MASS INDEX: 24.63 KG/M2 | WEIGHT: 139 LBS

## 2018-10-15 DIAGNOSIS — M75.111 INCOMPLETE TEAR OF RIGHT ROTATOR CUFF: Primary | ICD-10-CM

## 2018-10-15 DIAGNOSIS — Z09 FOLLOW-UP EXAMINATION FOLLOWING SURGERY: ICD-10-CM

## 2018-10-15 DIAGNOSIS — M79.7 FIBROMYALGIA: ICD-10-CM

## 2018-10-15 RX ORDER — DULOXETIN HYDROCHLORIDE 20 MG/1
40 CAPSULE, DELAYED RELEASE ORAL
COMMUNITY
Start: 2018-09-27 | End: 2019-09-27

## 2018-10-15 NOTE — MR AVS SNAPSHOT
"              After Visit Summary   10/15/2018    Autumn Matthews    MRN: [id number]           Patient Information     Date Of Birth          1940        Visit Information        Provider Department      10/15/2018 1:20 PM Mike Carter MD [facility]        Today's Diagnoses     Incomplete tear of right rotator cuff    -  1    Fibromyalgia           Follow-ups after your visit        Who to contact     Please call your clinic at [phone] to:    Ask questions about your health    Make or cancel appointments    Discuss your medicines    Learn about your test results    Speak to your doctor            Additional Information About Your Visit        MyChart Information     [employer] is an electronic gateway that provides easy, online access to your medical records. With [employer], you can request a clinic appointment, read your test results, renew a prescription or communicate with your care team.     To sign up for [employer] visit the website at [email or web address]   You will be asked to enter the access code listed below, as well as some personal information. Please follow the directions to create your username and password.     Your access code is: [email or web address]  Expires: 2018  6:31 AM     Your access code will  in 90 days. If you need help or a new code, please contact your [facility] or call [phone] for assistance.        Care EveryWhere ID     This is your Care EveryWhere ID. This could be used by other organizations to access your [facility] medical records  [id number]        Your Vitals Were     Height BMI (Body Mass Index)                1.6 m (5' 3\") 24.62 kg/m2           Blood Pressure from Last 3 Encounters:   10/14/17 115/64    Weight from Last 3 Encounters:   10/15/18 63 kg (139 lb)   18 63 kg (139 lb)   17 63.1 kg (139 lb 1.6 oz)              Today, you had the following     No orders found for display       " Primary Care Provider Office Phone # Fax #    Rosette Rouse [phone]       [facility] [address] [place] [address]        Equal Access to Services     MICHELLE KESSLER : Kojo filiberto ku gayatrio Soestefaniaali, waaxda luqadaha, qaybta kaalmada adeegyada, azar guntern fransisco dugan laYannasreedhar lucero. So [facility] [phone].    ATENCIÓN: Si habla español, tiene a pinzon disposición servicios gratuitos de asistencia lingüística. Llame al [phone].    We comply with applicable federal civil rights laws and [place] laws. We do not discriminate on the basis of race, color, national origin, age, disability, sex, sexual orientation, or gender identity.            Thank you!     Thank you for choosing [facility]  for your care. Our goal is always to provide you with excellent care. Hearing back from our patients is one way we can continue to improve our services. Please take a few minutes to complete the written survey that you may receive in the mail after your visit with us. Thank you!             Your Updated Medication List - Protect others around you: Learn how to safely use, store and throw away your medicines at www.disposemymeds.org.          This list is accurate as of 10/15/18 11:59 PM.  Always use your most recent med list.                   Brand Name Dispense Instructions for use Diagnosis    acetaminophen 325 MG tablet    TYLENOL    100 tablet    Take 2 tablets (650 mg) by mouth every 4 hours as needed for other (surgical pain)    History of total shoulder replacement, unspecified laterality       ADVIL 200 MG tablet   Generic drug:  ibuprofen           amoxicillin 500 MG tablet    AMOXIL    4 tablet    Take 2 grams (4 tablets) 1 hour before dental cleaning or procedures    Status post replacement of right shoulder joint       cholecalciferol 1000 UNIT tablet    vitamin D3     daily (every 24 hours).        DULoxetine 20 MG EC capsule    CYMBALTA     Take 40 mg by  mouth        escitalopram 20 MG tablet    LEXAPRO     Take 20 mg by mouth        ESTRADIOL 0.025/ESTRIOL 0.1 MG/GM CREAM      Estradiol vaginal cream 20mcg/gm.  Apply 0.5gm into vagina two times per week.        fish oil-omega-3 fatty acids 1000 MG capsule      daily (every 24 hours).        GABAPENTIN PO      Take 300 mg by mouth 3 times daily        loratadine 10 MG tablet    CLARITIN     Take 10 mg by mouth        MAGNESIUM PO           MULTIPLE VITAMINS-MINERALS PO           traZODone 50 MG tablet    DESYREL     Take 25-50 mg by mouth        zolpidem 5 MG tablet    AMBIEN     Take 5 mg by mouth

## 2018-10-15 NOTE — PROGRESS NOTES
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  Date of surgery 10/11/2017.      HISTORY OF PRESENT ILLNESS:  Ms. Matthews returns today for followup.  She notes that her shoulder is better than it was preoperatively, but she still has some problems with it.  She cannot reach across to her opposite ear with that arm.  She was recently diagnosed with fibromyalgia.      RADIOGRAPHS:  Radiographs obtained today demonstrate a reverse total shoulder arthroplasty with no evidence of radiographic lucency.      PHYSICAL EXAMINATION:  On exam today, she is a well-developed woman in mild distress.  She articulates and communicates well with a somewhat flattened affect.  Her breathing is nonlabored.  Her sensation is intact in the axillary nerve distribution.  She has 105 degrees of active forward elevation, 100 degrees of lateral elevation, 20 degrees of external rotation at the side and internal rotation of the back to the gluteus.  She has 7 pounds of forward elevation strength and 9 pounds of external rotation strength as measured by a hand held dynamometer and an independent examiner.  This compares to 14 and 12 on the contralateral side.      ASSESSMENT:  Right shoulder status post above procedure with continued symptoms of weakness.      PLAN:  I had a nice talk with Ms. Matthews today.  I told her that I thought her shoulder was actually doing reasonably well for a reverse shoulder replacement, but that I expect continued improvement over time.  She should continue to work on her home exercise program for another year.  We discussed dental prophylaxis as well.

## 2018-10-15 NOTE — LETTER
10/15/2018       RE: Autumn Matthews  [address]  [facility] [address]     Dear Colleague,    Thank you for referring your patient, Autumn Matthews, [facility]. Please see a copy of my visit note below.    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  Date of surgery 10/11/2017.      HISTORY OF PRESENT ILLNESS:  Ms. Matthews returns today for followup.  She notes that her shoulder is better than it was preoperatively, but she still has some problems with it.  She cannot reach across to her opposite ear with that arm.  She was recently diagnosed with fibromyalgia.      RADIOGRAPHS:  Radiographs obtained today demonstrate a reverse total shoulder arthroplasty with no evidence of radiographic lucency.      PHYSICAL EXAMINATION:  On exam today, she is a well-developed woman in mild distress.  She articulates and communicates well with a somewhat flattened affect.  Her breathing is nonlabored.  Her sensation is intact in the axillary nerve distribution.  She has 105 degrees of active forward elevation, 100 degrees of lateral elevation, 20 degrees of external rotation at the side and internal rotation of the back to the gluteus.  She has 7 pounds of forward elevation strength and 9 pounds of external rotation strength as measured by a hand held dynamometer and an independent examiner.  This compares to 14 and 12 on the contralateral side.      ASSESSMENT:  Right shoulder status post above procedure with continued symptoms of weakness.      PLAN:  I had a nice talk with Ms. Matthews today.  I told her that I thought her shoulder was actually doing reasonably well for a reverse shoulder replacement, but that I expect continued improvement over time.  She should continue to work on her home exercise program for another year.  We discussed dental prophylaxis as well.   Mike Carter MD

## 2018-10-15 NOTE — NURSING NOTE
"Reason For Visit:   Chief Complaint   Patient presents with     Surgical Followup     DOS 10/11/18 S/P Right reverse TSA        PCP: Rosette Rouse  Ref: self      ?  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  Date of injury: 2016  Type of injury: Carrying a heavy box downstairs. Had a fall later in the year.  Date of surgery: 10/11/17  Type of surgery: Right reverse total shoulder arthroplasty  Smoker: No        Right hand dominant    SANE score  Affected shoulder: Right  Right shoulder SANE: 60  Left shoulder SANE: 70    Dynamometer  Forward Elevation:  R = 7 pounds,  L = 14 pounds  External Rotation:   R = 9 pounds,  L = 12 pounds    Ht 1.6 m (5' 3\")  Wt 63 kg (139 lb)  BMI 24.62 kg/m2      Pain Assessment  Patient Currently in Pain: Yes  0-10 Pain Scale: 1  Primary Pain Location: Shoulder  Pain Orientation: Right  Pain Descriptors: Aching, Constant  Aggravating Factors: Movement      Cielo Ceja LPN        "

## 2020-10-26 ENCOUNTER — TELEPHONE (OUTPATIENT)
Dept: ORTHOPEDICS | Facility: CLINIC | Age: 80
End: 2020-10-26

## 2020-10-26 NOTE — TELEPHONE ENCOUNTER
Patient was contacted concerning scheduling an annual follow-up appointment.  She is status post reverse total shoulder arthroplasty 10/11/2017.  She states she is unable to do some activities but feels she is doing well.  She is leaving for [place] Sunday and will return in 5 months.  She stated she would schedule a  clinic visit after she returns to [place].

## 2025-02-17 RX ORDER — LATANOPROST 50 UG/ML
1 SOLUTION/ DROPS OPHTHALMIC AT BEDTIME
Qty: 2.5 ML | Refills: 7 | OUTPATIENT
Start: 2025-02-17

## 2025-02-17 NOTE — TELEPHONE ENCOUNTER
Dr. Cam does not see patients at [facility]. [facility] may be [facility]  [address] [place] [address] [phone]    Provider not at [facility]/ patient not seen@ [facility].

## (undated) DEVICE — ESU PENCIL W/HOLSTER E2350H

## (undated) DEVICE — SOL NACL 0.9% IRRIG 1000ML BOTTLE 2F7124

## (undated) DEVICE — GLOVE PROTEXIS BLUE W/NEU-THERA 7.0  2D73EB70

## (undated) DEVICE — SYR BULB IRRIG 50ML LATEX FREE 0035280

## (undated) DEVICE — DRAPE SLEEVE 599

## (undated) DEVICE — WIPES FOLEY CARE SURESTEP PROVON DFC100

## (undated) DEVICE — ESU ELEC NDL 1" E1552

## (undated) DEVICE — STRAP KNEE/BODY 31143004

## (undated) DEVICE — SU VICRYL 2-0 CT-1 27" UND J259H

## (undated) DEVICE — BLADE CLIPPER SGL USE 9680

## (undated) DEVICE — PACK SET-UP STD 9102

## (undated) DEVICE — PAD ARMBOARD FOAM EGGCRATE COVIDEN 3114367

## (undated) DEVICE — SU ETHIBOND 1 CTX CR 8/18" CX30D

## (undated) DEVICE — SU MONOCRYL 3-0 PS-2 18" UND Y497G

## (undated) DEVICE — LIGHT HANDLE X2

## (undated) DEVICE — CATH TRAY FOLEY SURESTEP 16FR W/URINE MTR STATLK LF A303416A

## (undated) DEVICE — GLOVE PROTEXIS POWDER FREE 8.5 ORTHOPEDIC 2D73ET85

## (undated) DEVICE — DRAPE U-DRAPE 1015NSD NON-STERILE

## (undated) DEVICE — LINEN DRAPE 54X72" 5467

## (undated) DEVICE — BLADE KNIFE SURG 10 371110

## (undated) DEVICE — DRSG DRAIN 4X4" 7086

## (undated) DEVICE — DRAPE IOBAN INCISE 23X17" 6650EZ

## (undated) DEVICE — IMM KIT SHOULDER STABILIZATION 7210573

## (undated) DEVICE — GLOVE PROTEXIS W/NEU-THERA 6.5  2D73TE65

## (undated) DEVICE — RESTRAINT LIMB HOLDER ANKLE/WRIST FOAM W/QUICK RELEASE 2533

## (undated) DEVICE — SPONGE LAP 18X18" X8435

## (undated) DEVICE — SOL WATER IRRIG 1000ML BOTTLE 2F7114

## (undated) DEVICE — ESU CLEANER TIP 31142717

## (undated) DEVICE — DRSG STERI STRIP 1/2X4" R1547

## (undated) DEVICE — LINEN ORTHO PACK 5446

## (undated) DEVICE — SU FIBERWIRE 2 38"  AR-7200

## (undated) DEVICE — DRSG TEGADERM 4X4 3/4" 1626W

## (undated) DEVICE — BONE CLEANING TIP INTERPULSE  0210-010-000

## (undated) DEVICE — IMM KIT SHOULDER TMAX MASK FACE 7210559

## (undated) DEVICE — DRSG AQUACEL AG 3.5X9.75" HYDROFIBER 412011

## (undated) DEVICE — ESU GROUND PAD ADULT W/CORD E7507

## (undated) DEVICE — Device

## (undated) DEVICE — LINEN TOWEL PACK X5 5464

## (undated) DEVICE — SU NDL MCGOWAN 1/2 1859-6D

## (undated) DEVICE — SUCTION MANIFOLD DORNOCH ULTRA CART UL-CL500

## (undated) DEVICE — SU VICRYL 0 CT-1 27" UND J260H

## (undated) DEVICE — GLOVE PROTEXIS W/NEU-THERA 8.5  2D73TE85

## (undated) DEVICE — SU SILK 2-0 TIE 12X30" A305H

## (undated) DEVICE — BLADE SAW SAGITTAL STRK 20.7X85X0.89MM 2108-109-000S13

## (undated) DEVICE — BRUSH SURGICAL SCRUB W/4% CHLORHEXIDINE GLUCONATE SOL 4458A

## (undated) DEVICE — SUCTION IRR SYSTEM W/O TIP INTERPULSE HANDPIECE 0210-100-000

## (undated) DEVICE — SOL HYDROGEN PEROXIDE 3% 4OZ BOTTLE F0010

## (undated) DEVICE — DRAPE U-POUCH 34X29" 1067

## (undated) RX ORDER — FENTANYL CITRATE 50 UG/ML
INJECTION, SOLUTION INTRAMUSCULAR; INTRAVENOUS
Status: DISPENSED
Start: 2017-10-11

## (undated) RX ORDER — EPHEDRINE SULFATE 50 MG/ML
INJECTION, SOLUTION INTRAMUSCULAR; INTRAVENOUS; SUBCUTANEOUS
Status: DISPENSED
Start: 2017-10-11

## (undated) RX ORDER — PROPOFOL 10 MG/ML
INJECTION, EMULSION INTRAVENOUS
Status: DISPENSED
Start: 2017-10-11

## (undated) RX ORDER — CEFAZOLIN SODIUM 2 G/100ML
INJECTION, SOLUTION INTRAVENOUS
Status: DISPENSED
Start: 2017-10-11

## (undated) RX ORDER — PHENYLEPHRINE HCL IN 0.9% NACL 1 MG/10 ML
SYRINGE (ML) INTRAVENOUS
Status: DISPENSED
Start: 2017-10-11

## (undated) RX ORDER — DEXAMETHASONE SODIUM PHOSPHATE 4 MG/ML
INJECTION, SOLUTION INTRA-ARTICULAR; INTRALESIONAL; INTRAMUSCULAR; INTRAVENOUS; SOFT TISSUE
Status: DISPENSED
Start: 2017-10-11

## (undated) RX ORDER — ONDANSETRON 2 MG/ML
INJECTION INTRAMUSCULAR; INTRAVENOUS
Status: DISPENSED
Start: 2017-10-11